# Patient Record
Sex: FEMALE | Race: WHITE | HISPANIC OR LATINO | URBAN - METROPOLITAN AREA
[De-identification: names, ages, dates, MRNs, and addresses within clinical notes are randomized per-mention and may not be internally consistent; named-entity substitution may affect disease eponyms.]

---

## 2023-01-01 ENCOUNTER — INPATIENT (INPATIENT)
Facility: HOSPITAL | Age: 53
LOS: 3 days | DRG: 20 | End: 2023-08-27
Attending: NEUROLOGICAL SURGERY | Admitting: NEUROLOGICAL SURGERY
Payer: MEDICAID

## 2023-01-01 VITALS — RESPIRATION RATE: 18 BRPM | OXYGEN SATURATION: 100 % | HEART RATE: 90 BPM

## 2023-01-01 VITALS
RESPIRATION RATE: 12 BRPM | SYSTOLIC BLOOD PRESSURE: 96 MMHG | DIASTOLIC BLOOD PRESSURE: 58 MMHG | HEART RATE: 74 BPM | OXYGEN SATURATION: 100 %

## 2023-01-01 DIAGNOSIS — B10.81 HUMAN HERPESVIRUS 6 INFECTION: ICD-10-CM

## 2023-01-01 DIAGNOSIS — G00.0: ICD-10-CM

## 2023-01-01 DIAGNOSIS — I60.9 NONTRAUMATIC SUBARACHNOID HEMORRHAGE, UNSPECIFIED: ICD-10-CM

## 2023-01-01 DIAGNOSIS — E23.2 DIABETES INSIPIDUS: ICD-10-CM

## 2023-01-01 DIAGNOSIS — I82.432 ACUTE EMBOLISM AND THROMBOSIS OF LEFT POPLITEAL VEIN: ICD-10-CM

## 2023-01-01 DIAGNOSIS — R29.731 NIHSS SCORE 31: ICD-10-CM

## 2023-01-01 DIAGNOSIS — R31.9 HEMATURIA, UNSPECIFIED: ICD-10-CM

## 2023-01-01 DIAGNOSIS — G93.6 CEREBRAL EDEMA: ICD-10-CM

## 2023-01-01 DIAGNOSIS — K92.2 GASTROINTESTINAL HEMORRHAGE, UNSPECIFIED: ICD-10-CM

## 2023-01-01 DIAGNOSIS — I63.9 CEREBRAL INFARCTION, UNSPECIFIED: ICD-10-CM

## 2023-01-01 DIAGNOSIS — R53.2 FUNCTIONAL QUADRIPLEGIA: ICD-10-CM

## 2023-01-01 DIAGNOSIS — D69.6 THROMBOCYTOPENIA, UNSPECIFIED: ICD-10-CM

## 2023-01-01 DIAGNOSIS — E09.65 DRUG OR CHEMICAL INDUCED DIABETES MELLITUS WITH HYPERGLYCEMIA: ICD-10-CM

## 2023-01-01 DIAGNOSIS — Z71.89 OTHER SPECIFIED COUNSELING: ICD-10-CM

## 2023-01-01 DIAGNOSIS — I21.4 NON-ST ELEVATION (NSTEMI) MYOCARDIAL INFARCTION: ICD-10-CM

## 2023-01-01 DIAGNOSIS — J15.211 PNEUMONIA DUE TO METHICILLIN SUSCEPTIBLE STAPHYLOCOCCUS AUREUS: ICD-10-CM

## 2023-01-01 DIAGNOSIS — J96.00 ACUTE RESPIRATORY FAILURE, UNSPECIFIED WHETHER WITH HYPOXIA OR HYPERCAPNIA: ICD-10-CM

## 2023-01-01 DIAGNOSIS — I82.452 ACUTE EMBOLISM AND THROMBOSIS OF LEFT PERONEAL VEIN: ICD-10-CM

## 2023-01-01 DIAGNOSIS — I10 ESSENTIAL (PRIMARY) HYPERTENSION: ICD-10-CM

## 2023-01-01 DIAGNOSIS — G93.2 BENIGN INTRACRANIAL HYPERTENSION: ICD-10-CM

## 2023-01-01 DIAGNOSIS — Z51.5 ENCOUNTER FOR PALLIATIVE CARE: ICD-10-CM

## 2023-01-01 DIAGNOSIS — I60.32 NONTRAUMATIC SUBARACHNOID HEMORRHAGE FROM LEFT POSTERIOR COMMUNICATING ARTERY: ICD-10-CM

## 2023-01-01 DIAGNOSIS — I61.5 NONTRAUMATIC INTRACEREBRAL HEMORRHAGE, INTRAVENTRICULAR: ICD-10-CM

## 2023-01-01 DIAGNOSIS — I63.512 CEREBRAL INFARCTION DUE TO UNSPECIFIED OCCLUSION OR STENOSIS OF LEFT MIDDLE CEREBRAL ARTERY: ICD-10-CM

## 2023-01-01 LAB
-  CLINDAMYCIN: SIGNIFICANT CHANGE UP
-  CLINDAMYCIN: SIGNIFICANT CHANGE UP
-  ERYTHROMYCIN: SIGNIFICANT CHANGE UP
-  ERYTHROMYCIN: SIGNIFICANT CHANGE UP
-  LINEZOLID: SIGNIFICANT CHANGE UP
-  LINEZOLID: SIGNIFICANT CHANGE UP
-  OXACILLIN: SIGNIFICANT CHANGE UP
-  OXACILLIN: SIGNIFICANT CHANGE UP
-  RIFAMPIN: SIGNIFICANT CHANGE UP
-  RIFAMPIN: SIGNIFICANT CHANGE UP
-  TRIMETHOPRIM/SULFAMETHOXAZOLE: SIGNIFICANT CHANGE UP
-  TRIMETHOPRIM/SULFAMETHOXAZOLE: SIGNIFICANT CHANGE UP
-  VANCOMYCIN: SIGNIFICANT CHANGE UP
-  VANCOMYCIN: SIGNIFICANT CHANGE UP
A1C WITH ESTIMATED AVERAGE GLUCOSE RESULT: 6.2 % — HIGH (ref 4–5.6)
ALBUMIN SERPL ELPH-MCNC: 2.7 G/DL — LOW (ref 3.3–5)
ALBUMIN SERPL ELPH-MCNC: 3 G/DL — LOW (ref 3.3–5)
ALBUMIN SERPL ELPH-MCNC: 3.4 G/DL — SIGNIFICANT CHANGE UP (ref 3.3–5)
ALBUMIN SERPL ELPH-MCNC: 3.5 G/DL — SIGNIFICANT CHANGE UP (ref 3.3–5)
ALBUMIN SERPL ELPH-MCNC: 4.2 G/DL — SIGNIFICANT CHANGE UP (ref 3.3–5)
ALBUMIN SERPL ELPH-MCNC: 4.2 G/DL — SIGNIFICANT CHANGE UP (ref 3.3–5)
ALP SERPL-CCNC: 56 U/L — SIGNIFICANT CHANGE UP (ref 40–120)
ALP SERPL-CCNC: 57 U/L — SIGNIFICANT CHANGE UP (ref 40–120)
ALP SERPL-CCNC: 61 U/L — SIGNIFICANT CHANGE UP (ref 40–120)
ALP SERPL-CCNC: 67 U/L — SIGNIFICANT CHANGE UP (ref 40–120)
ALP SERPL-CCNC: 67 U/L — SIGNIFICANT CHANGE UP (ref 40–120)
ALP SERPL-CCNC: 68 U/L — SIGNIFICANT CHANGE UP (ref 40–120)
ALT FLD-CCNC: 108 U/L — HIGH (ref 10–45)
ALT FLD-CCNC: 45 U/L — SIGNIFICANT CHANGE UP (ref 10–45)
ALT FLD-CCNC: 49 U/L — HIGH (ref 10–45)
ALT FLD-CCNC: 64 U/L — HIGH (ref 10–45)
ALT FLD-CCNC: 64 U/L — HIGH (ref 10–45)
ALT FLD-CCNC: 93 U/L — HIGH (ref 10–45)
ANION GAP SERPL CALC-SCNC: 10 MMOL/L — SIGNIFICANT CHANGE UP (ref 5–17)
ANION GAP SERPL CALC-SCNC: 11 MMOL/L — SIGNIFICANT CHANGE UP (ref 5–17)
ANION GAP SERPL CALC-SCNC: 11 MMOL/L — SIGNIFICANT CHANGE UP (ref 5–17)
ANION GAP SERPL CALC-SCNC: 12 MMOL/L — SIGNIFICANT CHANGE UP (ref 5–17)
ANION GAP SERPL CALC-SCNC: 12 MMOL/L — SIGNIFICANT CHANGE UP (ref 5–17)
ANION GAP SERPL CALC-SCNC: 16 MMOL/L — SIGNIFICANT CHANGE UP (ref 5–17)
ANION GAP SERPL CALC-SCNC: 6 MMOL/L — SIGNIFICANT CHANGE UP (ref 5–17)
ANION GAP SERPL CALC-SCNC: 7 MMOL/L — SIGNIFICANT CHANGE UP (ref 5–17)
ANION GAP SERPL CALC-SCNC: 8 MMOL/L — SIGNIFICANT CHANGE UP (ref 5–17)
ANION GAP SERPL CALC-SCNC: 9 MMOL/L — SIGNIFICANT CHANGE UP (ref 5–17)
ANION GAP SERPL CALC-SCNC: 9 MMOL/L — SIGNIFICANT CHANGE UP (ref 5–17)
APPEARANCE CSF: SIGNIFICANT CHANGE UP
APPEARANCE SPUN FLD: COLORLESS — SIGNIFICANT CHANGE UP
APPEARANCE UR: CLEAR — SIGNIFICANT CHANGE UP
APTT BLD: 29.9 SEC — SIGNIFICANT CHANGE UP (ref 24.5–35.6)
APTT BLD: 34 SEC — SIGNIFICANT CHANGE UP (ref 24.5–35.6)
AST SERPL-CCNC: 27 U/L — SIGNIFICANT CHANGE UP (ref 10–40)
AST SERPL-CCNC: 35 U/L — SIGNIFICANT CHANGE UP (ref 10–40)
AST SERPL-CCNC: 40 U/L — SIGNIFICANT CHANGE UP (ref 10–40)
AST SERPL-CCNC: 48 U/L — HIGH (ref 10–40)
AST SERPL-CCNC: 63 U/L — HIGH (ref 10–40)
AST SERPL-CCNC: 63 U/L — HIGH (ref 10–40)
BACTERIA # UR AUTO: SIGNIFICANT CHANGE UP /HPF
BASE EXCESS BLDA CALC-SCNC: -0.5 MMOL/L — SIGNIFICANT CHANGE UP (ref -2–3)
BASE EXCESS BLDA CALC-SCNC: -0.6 MMOL/L — SIGNIFICANT CHANGE UP (ref -2–3)
BASE EXCESS BLDA CALC-SCNC: -0.7 MMOL/L — SIGNIFICANT CHANGE UP (ref -2–3)
BASE EXCESS BLDA CALC-SCNC: -4.4 MMOL/L — LOW (ref -2–3)
BASE EXCESS BLDA CALC-SCNC: 0.7 MMOL/L — SIGNIFICANT CHANGE UP (ref -2–3)
BASE EXCESS BLDA CALC-SCNC: 1 MMOL/L — SIGNIFICANT CHANGE UP (ref -2–3)
BASE EXCESS BLDA CALC-SCNC: 1.3 MMOL/L — SIGNIFICANT CHANGE UP (ref -2–3)
BASE EXCESS BLDA CALC-SCNC: 2.8 MMOL/L — SIGNIFICANT CHANGE UP (ref -2–3)
BASE EXCESS BLDA CALC-SCNC: 3.5 MMOL/L — HIGH (ref -2–3)
BASE EXCESS BLDA CALC-SCNC: 3.7 MMOL/L — HIGH (ref -2–3)
BASOPHILS # BLD AUTO: 0.03 K/UL — SIGNIFICANT CHANGE UP (ref 0–0.2)
BASOPHILS NFR BLD AUTO: 0.2 % — SIGNIFICANT CHANGE UP (ref 0–2)
BILIRUB SERPL-MCNC: 0.2 MG/DL — SIGNIFICANT CHANGE UP (ref 0.2–1.2)
BILIRUB SERPL-MCNC: 0.2 MG/DL — SIGNIFICANT CHANGE UP (ref 0.2–1.2)
BILIRUB SERPL-MCNC: 0.4 MG/DL — SIGNIFICANT CHANGE UP (ref 0.2–1.2)
BILIRUB SERPL-MCNC: 0.5 MG/DL — SIGNIFICANT CHANGE UP (ref 0.2–1.2)
BILIRUB SERPL-MCNC: 0.5 MG/DL — SIGNIFICANT CHANGE UP (ref 0.2–1.2)
BILIRUB SERPL-MCNC: 0.6 MG/DL — SIGNIFICANT CHANGE UP (ref 0.2–1.2)
BILIRUB UR-MCNC: NEGATIVE — SIGNIFICANT CHANGE UP
BLD GP AB SCN SERPL QL: NEGATIVE — SIGNIFICANT CHANGE UP
BUN SERPL-MCNC: 10 MG/DL — SIGNIFICANT CHANGE UP (ref 7–23)
BUN SERPL-MCNC: 10 MG/DL — SIGNIFICANT CHANGE UP (ref 7–23)
BUN SERPL-MCNC: 11 MG/DL — SIGNIFICANT CHANGE UP (ref 7–23)
BUN SERPL-MCNC: 11 MG/DL — SIGNIFICANT CHANGE UP (ref 7–23)
BUN SERPL-MCNC: 4 MG/DL — LOW (ref 7–23)
BUN SERPL-MCNC: 5 MG/DL — LOW (ref 7–23)
BUN SERPL-MCNC: 5 MG/DL — LOW (ref 7–23)
BUN SERPL-MCNC: 6 MG/DL — LOW (ref 7–23)
BUN SERPL-MCNC: 7 MG/DL — SIGNIFICANT CHANGE UP (ref 7–23)
BUN SERPL-MCNC: 8 MG/DL — SIGNIFICANT CHANGE UP (ref 7–23)
BUN SERPL-MCNC: 8 MG/DL — SIGNIFICANT CHANGE UP (ref 7–23)
BUN SERPL-MCNC: 9 MG/DL — SIGNIFICANT CHANGE UP (ref 7–23)
CALCIUM SERPL-MCNC: 7.4 MG/DL — LOW (ref 8.4–10.5)
CALCIUM SERPL-MCNC: 7.5 MG/DL — LOW (ref 8.4–10.5)
CALCIUM SERPL-MCNC: 7.6 MG/DL — LOW (ref 8.4–10.5)
CALCIUM SERPL-MCNC: 7.8 MG/DL — LOW (ref 8.4–10.5)
CALCIUM SERPL-MCNC: 7.8 MG/DL — LOW (ref 8.4–10.5)
CALCIUM SERPL-MCNC: 7.9 MG/DL — LOW (ref 8.4–10.5)
CALCIUM SERPL-MCNC: 8.2 MG/DL — LOW (ref 8.4–10.5)
CALCIUM SERPL-MCNC: 8.5 MG/DL — SIGNIFICANT CHANGE UP (ref 8.4–10.5)
CALCIUM SERPL-MCNC: 8.7 MG/DL — SIGNIFICANT CHANGE UP (ref 8.4–10.5)
CALCIUM SERPL-MCNC: 8.7 MG/DL — SIGNIFICANT CHANGE UP (ref 8.4–10.5)
CALCIUM SERPL-MCNC: 8.8 MG/DL — SIGNIFICANT CHANGE UP (ref 8.4–10.5)
CALCIUM SERPL-MCNC: 8.9 MG/DL — SIGNIFICANT CHANGE UP (ref 8.4–10.5)
CALCIUM SERPL-MCNC: 8.9 MG/DL — SIGNIFICANT CHANGE UP (ref 8.4–10.5)
CALCIUM SERPL-MCNC: 9.1 MG/DL — SIGNIFICANT CHANGE UP (ref 8.4–10.5)
CALCIUM SERPL-MCNC: 9.2 MG/DL — SIGNIFICANT CHANGE UP (ref 8.4–10.5)
CALCIUM SERPL-MCNC: 9.3 MG/DL — SIGNIFICANT CHANGE UP (ref 8.4–10.5)
CALCIUM SERPL-MCNC: 9.4 MG/DL — SIGNIFICANT CHANGE UP (ref 8.4–10.5)
CHLORIDE SERPL-SCNC: 102 MMOL/L — SIGNIFICANT CHANGE UP (ref 96–108)
CHLORIDE SERPL-SCNC: 105 MMOL/L — SIGNIFICANT CHANGE UP (ref 96–108)
CHLORIDE SERPL-SCNC: 120 MMOL/L — HIGH (ref 96–108)
CHLORIDE SERPL-SCNC: 122 MMOL/L — HIGH (ref 96–108)
CHLORIDE SERPL-SCNC: 124 MMOL/L — HIGH (ref 96–108)
CHLORIDE SERPL-SCNC: 126 MMOL/L — HIGH (ref 96–108)
CHLORIDE SERPL-SCNC: 126 MMOL/L — HIGH (ref 96–108)
CHLORIDE SERPL-SCNC: 127 MMOL/L — HIGH (ref 96–108)
CHLORIDE SERPL-SCNC: 128 MMOL/L — HIGH (ref 96–108)
CHLORIDE SERPL-SCNC: 129 MMOL/L — HIGH (ref 96–108)
CHLORIDE SERPL-SCNC: 130 MMOL/L — HIGH (ref 96–108)
CHLORIDE SERPL-SCNC: 131 MMOL/L — HIGH (ref 96–108)
CHLORIDE SERPL-SCNC: 132 MMOL/L — HIGH (ref 96–108)
CHLORIDE SERPL-SCNC: 135 MMOL/L — HIGH (ref 96–108)
CHLORIDE SERPL-SCNC: 137 MMOL/L — HIGH (ref 96–108)
CHLORIDE SERPL-SCNC: 138 MMOL/L — HIGH (ref 96–108)
CHOLEST SERPL-MCNC: 252 MG/DL — HIGH
CK MB CFR SERPL CALC: 13.7 NG/ML — HIGH (ref 0–6.7)
CK MB CFR SERPL CALC: 14.7 NG/ML — HIGH (ref 0–6.7)
CK MB CFR SERPL CALC: 22.6 NG/ML — HIGH (ref 0–6.7)
CK SERPL-CCNC: 292 U/L — HIGH (ref 25–170)
CK SERPL-CCNC: 615 U/L — HIGH (ref 25–170)
CK SERPL-CCNC: 663 U/L — HIGH (ref 25–170)
CO2 BLDA-SCNC: 22 MMOL/L — SIGNIFICANT CHANGE UP (ref 19–24)
CO2 BLDA-SCNC: 25 MMOL/L — HIGH (ref 19–24)
CO2 BLDA-SCNC: 27 MMOL/L — HIGH (ref 19–24)
CO2 BLDA-SCNC: 29 MMOL/L — HIGH (ref 19–24)
CO2 BLDA-SCNC: 30 MMOL/L — HIGH (ref 19–24)
CO2 BLDA-SCNC: 34 MMOL/L — HIGH (ref 19–24)
CO2 SERPL-SCNC: 20 MMOL/L — LOW (ref 22–31)
CO2 SERPL-SCNC: 20 MMOL/L — LOW (ref 22–31)
CO2 SERPL-SCNC: 21 MMOL/L — LOW (ref 22–31)
CO2 SERPL-SCNC: 22 MMOL/L — SIGNIFICANT CHANGE UP (ref 22–31)
CO2 SERPL-SCNC: 22 MMOL/L — SIGNIFICANT CHANGE UP (ref 22–31)
CO2 SERPL-SCNC: 23 MMOL/L — SIGNIFICANT CHANGE UP (ref 22–31)
CO2 SERPL-SCNC: 24 MMOL/L — SIGNIFICANT CHANGE UP (ref 22–31)
CO2 SERPL-SCNC: 25 MMOL/L — SIGNIFICANT CHANGE UP (ref 22–31)
CO2 SERPL-SCNC: 26 MMOL/L — SIGNIFICANT CHANGE UP (ref 22–31)
CO2 SERPL-SCNC: 26 MMOL/L — SIGNIFICANT CHANGE UP (ref 22–31)
CO2 SERPL-SCNC: 28 MMOL/L — SIGNIFICANT CHANGE UP (ref 22–31)
COLOR CSF: ABNORMAL
COLOR SPEC: YELLOW — SIGNIFICANT CHANGE UP
CREAT SERPL-MCNC: 0.44 MG/DL — LOW (ref 0.5–1.3)
CREAT SERPL-MCNC: 0.53 MG/DL — SIGNIFICANT CHANGE UP (ref 0.5–1.3)
CREAT SERPL-MCNC: 0.54 MG/DL — SIGNIFICANT CHANGE UP (ref 0.5–1.3)
CREAT SERPL-MCNC: 0.55 MG/DL — SIGNIFICANT CHANGE UP (ref 0.5–1.3)
CREAT SERPL-MCNC: 0.56 MG/DL — SIGNIFICANT CHANGE UP (ref 0.5–1.3)
CREAT SERPL-MCNC: 0.6 MG/DL — SIGNIFICANT CHANGE UP (ref 0.5–1.3)
CREAT SERPL-MCNC: 0.62 MG/DL — SIGNIFICANT CHANGE UP (ref 0.5–1.3)
CREAT SERPL-MCNC: 0.62 MG/DL — SIGNIFICANT CHANGE UP (ref 0.5–1.3)
CREAT SERPL-MCNC: 0.63 MG/DL — SIGNIFICANT CHANGE UP (ref 0.5–1.3)
CREAT SERPL-MCNC: 0.63 MG/DL — SIGNIFICANT CHANGE UP (ref 0.5–1.3)
CREAT SERPL-MCNC: 0.65 MG/DL — SIGNIFICANT CHANGE UP (ref 0.5–1.3)
CREAT SERPL-MCNC: 0.65 MG/DL — SIGNIFICANT CHANGE UP (ref 0.5–1.3)
CREAT SERPL-MCNC: 0.66 MG/DL — SIGNIFICANT CHANGE UP (ref 0.5–1.3)
CREAT SERPL-MCNC: 0.66 MG/DL — SIGNIFICANT CHANGE UP (ref 0.5–1.3)
CREAT SERPL-MCNC: 0.67 MG/DL — SIGNIFICANT CHANGE UP (ref 0.5–1.3)
CREAT SERPL-MCNC: 0.74 MG/DL — SIGNIFICANT CHANGE UP (ref 0.5–1.3)
CREAT SERPL-MCNC: 0.76 MG/DL — SIGNIFICANT CHANGE UP (ref 0.5–1.3)
CREAT SERPL-MCNC: 0.78 MG/DL — SIGNIFICANT CHANGE UP (ref 0.5–1.3)
CREAT SERPL-MCNC: 0.8 MG/DL — SIGNIFICANT CHANGE UP (ref 0.5–1.3)
CREAT SERPL-MCNC: 0.8 MG/DL — SIGNIFICANT CHANGE UP (ref 0.5–1.3)
CSF COMMENTS: SIGNIFICANT CHANGE UP
CSF PCR RESULT: DETECTED
CSF PCR RESULT: DETECTED
CULTURE RESULTS: NO GROWTH — SIGNIFICANT CHANGE UP
CULTURE RESULTS: SIGNIFICANT CHANGE UP
DIFF PNL FLD: ABNORMAL
EGFR: 104 ML/MIN/1.73M2 — SIGNIFICANT CHANGE UP
EGFR: 105 ML/MIN/1.73M2 — SIGNIFICANT CHANGE UP
EGFR: 106 ML/MIN/1.73M2 — SIGNIFICANT CHANGE UP
EGFR: 106 ML/MIN/1.73M2 — SIGNIFICANT CHANGE UP
EGFR: 107 ML/MIN/1.73M2 — SIGNIFICANT CHANGE UP
EGFR: 108 ML/MIN/1.73M2 — SIGNIFICANT CHANGE UP
EGFR: 109 ML/MIN/1.73M2 — SIGNIFICANT CHANGE UP
EGFR: 109 ML/MIN/1.73M2 — SIGNIFICANT CHANGE UP
EGFR: 110 ML/MIN/1.73M2 — SIGNIFICANT CHANGE UP
EGFR: 115 ML/MIN/1.73M2 — SIGNIFICANT CHANGE UP
EGFR: 88 ML/MIN/1.73M2 — SIGNIFICANT CHANGE UP
EGFR: 88 ML/MIN/1.73M2 — SIGNIFICANT CHANGE UP
EGFR: 90 ML/MIN/1.73M2 — SIGNIFICANT CHANGE UP
EGFR: 93 ML/MIN/1.73M2 — SIGNIFICANT CHANGE UP
EGFR: 96 ML/MIN/1.73M2 — SIGNIFICANT CHANGE UP
EOSINOPHIL # BLD AUTO: 0 K/UL — SIGNIFICANT CHANGE UP (ref 0–0.5)
EOSINOPHIL NFR BLD AUTO: 0 % — SIGNIFICANT CHANGE UP (ref 0–6)
EPI CELLS # UR: SIGNIFICANT CHANGE UP /HPF (ref 0–5)
ESTIMATED AVERAGE GLUCOSE: 131 MG/DL — HIGH (ref 68–114)
GAS PNL BLDA: SIGNIFICANT CHANGE UP
GLUCOSE BLDC GLUCOMTR-MCNC: 108 MG/DL — HIGH (ref 70–99)
GLUCOSE BLDC GLUCOMTR-MCNC: 109 MG/DL — HIGH (ref 70–99)
GLUCOSE BLDC GLUCOMTR-MCNC: 116 MG/DL — HIGH (ref 70–99)
GLUCOSE BLDC GLUCOMTR-MCNC: 117 MG/DL — HIGH (ref 70–99)
GLUCOSE BLDC GLUCOMTR-MCNC: 133 MG/DL — HIGH (ref 70–99)
GLUCOSE BLDC GLUCOMTR-MCNC: 134 MG/DL — HIGH (ref 70–99)
GLUCOSE BLDC GLUCOMTR-MCNC: 138 MG/DL — HIGH (ref 70–99)
GLUCOSE BLDC GLUCOMTR-MCNC: 143 MG/DL — HIGH (ref 70–99)
GLUCOSE BLDC GLUCOMTR-MCNC: 148 MG/DL — HIGH (ref 70–99)
GLUCOSE BLDC GLUCOMTR-MCNC: 149 MG/DL — HIGH (ref 70–99)
GLUCOSE BLDC GLUCOMTR-MCNC: 150 MG/DL — HIGH (ref 70–99)
GLUCOSE BLDC GLUCOMTR-MCNC: 153 MG/DL — HIGH (ref 70–99)
GLUCOSE BLDC GLUCOMTR-MCNC: 153 MG/DL — HIGH (ref 70–99)
GLUCOSE BLDC GLUCOMTR-MCNC: 154 MG/DL — HIGH (ref 70–99)
GLUCOSE BLDC GLUCOMTR-MCNC: 159 MG/DL — HIGH (ref 70–99)
GLUCOSE BLDC GLUCOMTR-MCNC: 167 MG/DL — HIGH (ref 70–99)
GLUCOSE BLDC GLUCOMTR-MCNC: 168 MG/DL — HIGH (ref 70–99)
GLUCOSE BLDC GLUCOMTR-MCNC: 170 MG/DL — HIGH (ref 70–99)
GLUCOSE BLDC GLUCOMTR-MCNC: 173 MG/DL — HIGH (ref 70–99)
GLUCOSE BLDC GLUCOMTR-MCNC: 174 MG/DL — HIGH (ref 70–99)
GLUCOSE BLDC GLUCOMTR-MCNC: 176 MG/DL — HIGH (ref 70–99)
GLUCOSE BLDC GLUCOMTR-MCNC: 198 MG/DL — HIGH (ref 70–99)
GLUCOSE BLDC GLUCOMTR-MCNC: 203 MG/DL — HIGH (ref 70–99)
GLUCOSE BLDC GLUCOMTR-MCNC: 204 MG/DL — HIGH (ref 70–99)
GLUCOSE BLDC GLUCOMTR-MCNC: 209 MG/DL — HIGH (ref 70–99)
GLUCOSE BLDC GLUCOMTR-MCNC: 209 MG/DL — HIGH (ref 70–99)
GLUCOSE BLDC GLUCOMTR-MCNC: 213 MG/DL — HIGH (ref 70–99)
GLUCOSE BLDC GLUCOMTR-MCNC: 217 MG/DL — HIGH (ref 70–99)
GLUCOSE BLDC GLUCOMTR-MCNC: 225 MG/DL — HIGH (ref 70–99)
GLUCOSE BLDC GLUCOMTR-MCNC: 226 MG/DL — HIGH (ref 70–99)
GLUCOSE BLDC GLUCOMTR-MCNC: 249 MG/DL — HIGH (ref 70–99)
GLUCOSE BLDC GLUCOMTR-MCNC: 254 MG/DL — HIGH (ref 70–99)
GLUCOSE BLDC GLUCOMTR-MCNC: 294 MG/DL — HIGH (ref 70–99)
GLUCOSE BLDC GLUCOMTR-MCNC: 313 MG/DL — HIGH (ref 70–99)
GLUCOSE BLDC GLUCOMTR-MCNC: 318 MG/DL — HIGH (ref 70–99)
GLUCOSE BLDC GLUCOMTR-MCNC: 321 MG/DL — HIGH (ref 70–99)
GLUCOSE BLDC GLUCOMTR-MCNC: 407 MG/DL — HIGH (ref 70–99)
GLUCOSE BLDC GLUCOMTR-MCNC: 415 MG/DL — HIGH (ref 70–99)
GLUCOSE BLDC GLUCOMTR-MCNC: 416 MG/DL — HIGH (ref 70–99)
GLUCOSE BLDC GLUCOMTR-MCNC: 458 MG/DL — CRITICAL HIGH (ref 70–99)
GLUCOSE BLDC GLUCOMTR-MCNC: 481 MG/DL — CRITICAL HIGH (ref 70–99)
GLUCOSE BLDC GLUCOMTR-MCNC: 501 MG/DL — CRITICAL HIGH (ref 70–99)
GLUCOSE BLDC GLUCOMTR-MCNC: 90 MG/DL — SIGNIFICANT CHANGE UP (ref 70–99)
GLUCOSE CSF-MCNC: 68 MG/DL — SIGNIFICANT CHANGE UP (ref 40–70)
GLUCOSE SERPL-MCNC: 113 MG/DL — HIGH (ref 70–99)
GLUCOSE SERPL-MCNC: 144 MG/DL — HIGH (ref 70–99)
GLUCOSE SERPL-MCNC: 146 MG/DL — HIGH (ref 70–99)
GLUCOSE SERPL-MCNC: 148 MG/DL — HIGH (ref 70–99)
GLUCOSE SERPL-MCNC: 160 MG/DL — HIGH (ref 70–99)
GLUCOSE SERPL-MCNC: 160 MG/DL — HIGH (ref 70–99)
GLUCOSE SERPL-MCNC: 161 MG/DL — HIGH (ref 70–99)
GLUCOSE SERPL-MCNC: 171 MG/DL — HIGH (ref 70–99)
GLUCOSE SERPL-MCNC: 181 MG/DL — HIGH (ref 70–99)
GLUCOSE SERPL-MCNC: 184 MG/DL — HIGH (ref 70–99)
GLUCOSE SERPL-MCNC: 186 MG/DL — HIGH (ref 70–99)
GLUCOSE SERPL-MCNC: 201 MG/DL — HIGH (ref 70–99)
GLUCOSE SERPL-MCNC: 202 MG/DL — HIGH (ref 70–99)
GLUCOSE SERPL-MCNC: 206 MG/DL — HIGH (ref 70–99)
GLUCOSE SERPL-MCNC: 270 MG/DL — HIGH (ref 70–99)
GLUCOSE SERPL-MCNC: 284 MG/DL — HIGH (ref 70–99)
GLUCOSE SERPL-MCNC: 334 MG/DL — HIGH (ref 70–99)
GLUCOSE SERPL-MCNC: 353 MG/DL — HIGH (ref 70–99)
GLUCOSE SERPL-MCNC: 382 MG/DL — HIGH (ref 70–99)
GLUCOSE SERPL-MCNC: 384 MG/DL — HIGH (ref 70–99)
GLUCOSE SERPL-MCNC: 396 MG/DL — HIGH (ref 70–99)
GLUCOSE SERPL-MCNC: 578 MG/DL — CRITICAL HIGH (ref 70–99)
GLUCOSE UR QL: NEGATIVE — SIGNIFICANT CHANGE UP
GRAM STN FLD: SIGNIFICANT CHANGE UP
HAEM INFLU DNA SPEC QL NAA+PROBE: DETECTED
HCG SERPL-ACNC: 0 MIU/ML — SIGNIFICANT CHANGE UP
HCO3 BLDA-SCNC: 21 MMOL/L — SIGNIFICANT CHANGE UP (ref 21–28)
HCO3 BLDA-SCNC: 24 MMOL/L — SIGNIFICANT CHANGE UP (ref 21–28)
HCO3 BLDA-SCNC: 25 MMOL/L — SIGNIFICANT CHANGE UP (ref 21–28)
HCO3 BLDA-SCNC: 28 MMOL/L — SIGNIFICANT CHANGE UP (ref 21–28)
HCO3 BLDA-SCNC: 31 MMOL/L — HIGH (ref 21–28)
HCO3 BLDA-SCNC: 31 MMOL/L — HIGH (ref 21–28)
HCO3 BLDA-SCNC: 32 MMOL/L — HIGH (ref 21–28)
HCT VFR BLD CALC: 30.2 % — LOW (ref 34.5–45)
HCT VFR BLD CALC: 30.7 % — LOW (ref 34.5–45)
HCT VFR BLD CALC: 34 % — LOW (ref 34.5–45)
HCT VFR BLD CALC: 38.1 % — SIGNIFICANT CHANGE UP (ref 34.5–45)
HCT VFR BLD CALC: 40.4 % — SIGNIFICANT CHANGE UP (ref 34.5–45)
HCT VFR BLD CALC: 40.4 % — SIGNIFICANT CHANGE UP (ref 34.5–45)
HCT VFR BLD CALC: 42.2 % — SIGNIFICANT CHANGE UP (ref 34.5–45)
HCT VFR BLD CALC: 42.8 % — SIGNIFICANT CHANGE UP (ref 34.5–45)
HCT VFR BLD CALC: 44.3 % — SIGNIFICANT CHANGE UP (ref 34.5–45)
HDLC SERPL-MCNC: 52 MG/DL — SIGNIFICANT CHANGE UP
HGB BLD-MCNC: 10.9 G/DL — LOW (ref 11.5–15.5)
HGB BLD-MCNC: 13.2 G/DL — SIGNIFICANT CHANGE UP (ref 11.5–15.5)
HGB BLD-MCNC: 13.2 G/DL — SIGNIFICANT CHANGE UP (ref 11.5–15.5)
HGB BLD-MCNC: 13.7 G/DL — SIGNIFICANT CHANGE UP (ref 11.5–15.5)
HGB BLD-MCNC: 14.3 G/DL — SIGNIFICANT CHANGE UP (ref 11.5–15.5)
HGB BLD-MCNC: 14.7 G/DL — SIGNIFICANT CHANGE UP (ref 11.5–15.5)
HGB BLD-MCNC: 15 G/DL — SIGNIFICANT CHANGE UP (ref 11.5–15.5)
HGB BLD-MCNC: 9.4 G/DL — LOW (ref 11.5–15.5)
HGB BLD-MCNC: 9.9 G/DL — LOW (ref 11.5–15.5)
HHV6 DNA CSF QL NAA+PROBE: DETECTED
HHV6 DNA CSF QL NAA+PROBE: DETECTED
IMM GRANULOCYTES NFR BLD AUTO: 0.4 % — SIGNIFICANT CHANGE UP (ref 0–0.9)
INR BLD: 0.98 — SIGNIFICANT CHANGE UP (ref 0.85–1.18)
INR BLD: 1.05 — SIGNIFICANT CHANGE UP (ref 0.85–1.18)
ISTAT ACTK (ACTIVATED CLOTTING TIME KAOLIN): 131 SEC — SIGNIFICANT CHANGE UP (ref 74–137)
KETONES UR-MCNC: NEGATIVE — SIGNIFICANT CHANGE UP
LACTATE SERPL-SCNC: 2 MMOL/L — SIGNIFICANT CHANGE UP (ref 0.5–2)
LACTATE SERPL-SCNC: 2.3 MMOL/L — HIGH (ref 0.5–2)
LACTATE SERPL-SCNC: 2.5 MMOL/L — HIGH (ref 0.5–2)
LACTATE SERPL-SCNC: 2.7 MMOL/L — HIGH (ref 0.5–2)
LACTATE SERPL-SCNC: 3.1 MMOL/L — HIGH (ref 0.5–2)
LACTATE SERPL-SCNC: 3.3 MMOL/L — HIGH (ref 0.5–2)
LACTATE SERPL-SCNC: 3.7 MMOL/L — HIGH (ref 0.5–2)
LEUKOCYTE ESTERASE UR-ACNC: NEGATIVE — SIGNIFICANT CHANGE UP
LIPID PNL WITH DIRECT LDL SERPL: 164 MG/DL — HIGH
LYMPHOCYTES # BLD AUTO: 22.9 % — SIGNIFICANT CHANGE UP (ref 13–44)
LYMPHOCYTES # BLD AUTO: 3.21 K/UL — SIGNIFICANT CHANGE UP (ref 1–3.3)
LYMPHOCYTES # CSF: 2 % — LOW (ref 40–80)
MAGNESIUM SERPL-MCNC: 1.6 MG/DL — SIGNIFICANT CHANGE UP (ref 1.6–2.6)
MAGNESIUM SERPL-MCNC: 1.8 MG/DL — SIGNIFICANT CHANGE UP (ref 1.6–2.6)
MAGNESIUM SERPL-MCNC: 2 MG/DL — SIGNIFICANT CHANGE UP (ref 1.6–2.6)
MAGNESIUM SERPL-MCNC: 2 MG/DL — SIGNIFICANT CHANGE UP (ref 1.6–2.6)
MAGNESIUM SERPL-MCNC: 2.3 MG/DL — SIGNIFICANT CHANGE UP (ref 1.6–2.6)
MAGNESIUM SERPL-MCNC: 2.4 MG/DL — SIGNIFICANT CHANGE UP (ref 1.6–2.6)
MAGNESIUM SERPL-MCNC: 2.6 MG/DL — SIGNIFICANT CHANGE UP (ref 1.6–2.6)
MCHC RBC-ENTMCNC: 29.9 PG — SIGNIFICANT CHANGE UP (ref 27–34)
MCHC RBC-ENTMCNC: 30.2 PG — SIGNIFICANT CHANGE UP (ref 27–34)
MCHC RBC-ENTMCNC: 30.4 PG — SIGNIFICANT CHANGE UP (ref 27–34)
MCHC RBC-ENTMCNC: 30.7 PG — SIGNIFICANT CHANGE UP (ref 27–34)
MCHC RBC-ENTMCNC: 31.1 GM/DL — LOW (ref 32–36)
MCHC RBC-ENTMCNC: 32.1 GM/DL — SIGNIFICANT CHANGE UP (ref 32–36)
MCHC RBC-ENTMCNC: 32.2 GM/DL — SIGNIFICANT CHANGE UP (ref 32–36)
MCHC RBC-ENTMCNC: 32.7 GM/DL — SIGNIFICANT CHANGE UP (ref 32–36)
MCHC RBC-ENTMCNC: 33.9 GM/DL — SIGNIFICANT CHANGE UP (ref 32–36)
MCHC RBC-ENTMCNC: 34.3 GM/DL — SIGNIFICANT CHANGE UP (ref 32–36)
MCHC RBC-ENTMCNC: 34.6 GM/DL — SIGNIFICANT CHANGE UP (ref 32–36)
MCV RBC AUTO: 87.2 FL — SIGNIFICANT CHANGE UP (ref 80–100)
MCV RBC AUTO: 88.4 FL — SIGNIFICANT CHANGE UP (ref 80–100)
MCV RBC AUTO: 88.6 FL — SIGNIFICANT CHANGE UP (ref 80–100)
MCV RBC AUTO: 89 FL — SIGNIFICANT CHANGE UP (ref 80–100)
MCV RBC AUTO: 89.8 FL — SIGNIFICANT CHANGE UP (ref 80–100)
MCV RBC AUTO: 92.4 FL — SIGNIFICANT CHANGE UP (ref 80–100)
MCV RBC AUTO: 95 FL — SIGNIFICANT CHANGE UP (ref 80–100)
MCV RBC AUTO: 95.3 FL — SIGNIFICANT CHANGE UP (ref 80–100)
MCV RBC AUTO: 97.1 FL — SIGNIFICANT CHANGE UP (ref 80–100)
METHOD TYPE: SIGNIFICANT CHANGE UP
METHOD TYPE: SIGNIFICANT CHANGE UP
MONOCYTES # BLD AUTO: 1.05 K/UL — HIGH (ref 0–0.9)
MONOCYTES NFR BLD AUTO: 7.5 % — SIGNIFICANT CHANGE UP (ref 2–14)
MONOS+MACROS NFR CSF: 2 % — LOW (ref 15–45)
NEUTROPHILS # BLD AUTO: 9.66 K/UL — HIGH (ref 1.8–7.4)
NEUTROPHILS # CSF: 5 % — SIGNIFICANT CHANGE UP (ref 0–6)
NEUTROPHILS NFR BLD AUTO: 69 % — SIGNIFICANT CHANGE UP (ref 43–77)
NITRITE UR-MCNC: NEGATIVE — SIGNIFICANT CHANGE UP
NON HDL CHOLESTEROL: 200 MG/DL — HIGH
NRBC # BLD: 0 /100 WBCS — SIGNIFICANT CHANGE UP (ref 0–0)
NRBC NFR CSF: 9 /UL — HIGH (ref 0–5)
NT-PROBNP SERPL-SCNC: 1409 PG/ML — HIGH (ref 0–300)
NT-PROBNP SERPL-SCNC: 3108 PG/ML — HIGH (ref 0–300)
NT-PROBNP SERPL-SCNC: 829 PG/ML — HIGH (ref 0–300)
OB PNL GAST: POSITIVE
OCCULT BLOOD, GASTRIC FLUID PH: 3 — SIGNIFICANT CHANGE UP (ref 1–7)
ORGANISM # SPEC MICROSCOPIC CNT: SIGNIFICANT CHANGE UP
ORGANISM # SPEC MICROSCOPIC CNT: SIGNIFICANT CHANGE UP
OSMOLALITY SERPL: 324 MOSM/KG — HIGH (ref 275–300)
OSMOLALITY SERPL: 326 MOSM/KG — HIGH (ref 275–300)
OSMOLALITY SERPL: 334 MOSM/KG — HIGH (ref 275–300)
OSMOLALITY SERPL: 345 MOSM/KG — HIGH (ref 275–300)
OSMOLALITY SERPL: 346 MOSM/KG — HIGH (ref 275–300)
OSMOLALITY SERPL: 347 MOSM/KG — HIGH (ref 275–300)
OSMOLALITY SERPL: 349 MOSM/KG — HIGH (ref 275–300)
OSMOLALITY SERPL: 351 MOSM/KG — HIGH (ref 275–300)
OSMOLALITY SERPL: 354 MOSM/KG — HIGH (ref 275–300)
OSMOLALITY SERPL: 361 MOSM/KG — HIGH (ref 275–300)
OSMOLALITY SERPL: 362 MOSM/KG — HIGH (ref 275–300)
OSMOLALITY SERPL: 362 MOSM/KG — HIGH (ref 275–300)
OSMOLALITY SERPL: 364 MOSM/KG — HIGH (ref 275–300)
OSMOLALITY SERPL: 365 MOSM/KG — HIGH (ref 275–300)
OSMOLALITY UR: 174 MOSM/KG — LOW (ref 300–900)
OSMOLALITY UR: 97 MOSM/KG — LOW (ref 300–900)
PCO2 BLDA: 39 MMHG — SIGNIFICANT CHANGE UP (ref 32–45)
PCO2 BLDA: 39 MMHG — SIGNIFICANT CHANGE UP (ref 32–45)
PCO2 BLDA: 40 MMHG — SIGNIFICANT CHANGE UP (ref 32–45)
PCO2 BLDA: 47 MMHG — HIGH (ref 32–45)
PCO2 BLDA: 60 MMHG — HIGH (ref 32–45)
PCO2 BLDA: 78 MMHG — CRITICAL HIGH (ref 32–45)
PCO2 BLDA: 79 MMHG — CRITICAL HIGH (ref 32–45)
PCO2 BLDA: 92 MMHG — CRITICAL HIGH (ref 32–45)
PH BLDA: 7.14 — CRITICAL LOW (ref 7.35–7.45)
PH BLDA: 7.21 — LOW (ref 7.35–7.45)
PH BLDA: 7.21 — LOW (ref 7.35–7.45)
PH BLDA: 7.27 — LOW (ref 7.35–7.45)
PH BLDA: 7.34 — LOW (ref 7.35–7.45)
PH BLDA: 7.39 — SIGNIFICANT CHANGE UP (ref 7.35–7.45)
PH BLDA: 7.39 — SIGNIFICANT CHANGE UP (ref 7.35–7.45)
PH BLDA: 7.41 — SIGNIFICANT CHANGE UP (ref 7.35–7.45)
PH BLDA: 7.45 — SIGNIFICANT CHANGE UP (ref 7.35–7.45)
PH BLDA: 7.46 — HIGH (ref 7.35–7.45)
PH UR: 7 — SIGNIFICANT CHANGE UP (ref 5–8)
PHOSPHATE SERPL-MCNC: 1.1 MG/DL — LOW (ref 2.5–4.5)
PHOSPHATE SERPL-MCNC: 1.2 MG/DL — LOW (ref 2.5–4.5)
PHOSPHATE SERPL-MCNC: 1.6 MG/DL — LOW (ref 2.5–4.5)
PHOSPHATE SERPL-MCNC: 2 MG/DL — LOW (ref 2.5–4.5)
PHOSPHATE SERPL-MCNC: 2 MG/DL — LOW (ref 2.5–4.5)
PHOSPHATE SERPL-MCNC: 2.1 MG/DL — LOW (ref 2.5–4.5)
PHOSPHATE SERPL-MCNC: 2.4 MG/DL — LOW (ref 2.5–4.5)
PHOSPHATE SERPL-MCNC: 3.7 MG/DL — SIGNIFICANT CHANGE UP (ref 2.5–4.5)
PHOSPHATE SERPL-MCNC: 3.9 MG/DL — SIGNIFICANT CHANGE UP (ref 2.5–4.5)
PHOSPHATE SERPL-MCNC: 4.4 MG/DL — SIGNIFICANT CHANGE UP (ref 2.5–4.5)
PHOSPHATE SERPL-MCNC: 4.8 MG/DL — HIGH (ref 2.5–4.5)
PHOSPHATE SERPL-MCNC: 4.8 MG/DL — HIGH (ref 2.5–4.5)
PLATELET # BLD AUTO: 150 K/UL — SIGNIFICANT CHANGE UP (ref 150–400)
PLATELET # BLD AUTO: 172 K/UL — SIGNIFICANT CHANGE UP (ref 150–400)
PLATELET # BLD AUTO: 172 K/UL — SIGNIFICANT CHANGE UP (ref 150–400)
PLATELET # BLD AUTO: 242 K/UL — SIGNIFICANT CHANGE UP (ref 150–400)
PLATELET # BLD AUTO: 245 K/UL — SIGNIFICANT CHANGE UP (ref 150–400)
PLATELET # BLD AUTO: 252 K/UL — SIGNIFICANT CHANGE UP (ref 150–400)
PLATELET # BLD AUTO: 74 K/UL — LOW (ref 150–400)
PLATELET # BLD AUTO: 75 K/UL — LOW (ref 150–400)
PLATELET # BLD AUTO: 97 K/UL — LOW (ref 150–400)
PO2 BLDA: 130 MMHG — HIGH (ref 83–108)
PO2 BLDA: 152 MMHG — HIGH (ref 83–108)
PO2 BLDA: 159 MMHG — HIGH (ref 83–108)
PO2 BLDA: 169 MMHG — HIGH (ref 83–108)
PO2 BLDA: 263 MMHG — HIGH (ref 83–108)
PO2 BLDA: 287 MMHG — HIGH (ref 83–108)
PO2 BLDA: 304 MMHG — HIGH (ref 83–108)
PO2 BLDA: 343 MMHG — HIGH (ref 83–108)
PO2 BLDA: 89 MMHG — SIGNIFICANT CHANGE UP (ref 83–108)
PO2 BLDA: 93 MMHG — SIGNIFICANT CHANGE UP (ref 83–108)
POTASSIUM SERPL-MCNC: 2.8 MMOL/L — CRITICAL LOW (ref 3.5–5.3)
POTASSIUM SERPL-MCNC: 2.9 MMOL/L — CRITICAL LOW (ref 3.5–5.3)
POTASSIUM SERPL-MCNC: 3.1 MMOL/L — LOW (ref 3.5–5.3)
POTASSIUM SERPL-MCNC: 3.2 MMOL/L — LOW (ref 3.5–5.3)
POTASSIUM SERPL-MCNC: 3.2 MMOL/L — LOW (ref 3.5–5.3)
POTASSIUM SERPL-MCNC: 3.3 MMOL/L — LOW (ref 3.5–5.3)
POTASSIUM SERPL-MCNC: 3.4 MMOL/L — LOW (ref 3.5–5.3)
POTASSIUM SERPL-MCNC: 3.5 MMOL/L — SIGNIFICANT CHANGE UP (ref 3.5–5.3)
POTASSIUM SERPL-MCNC: 3.5 MMOL/L — SIGNIFICANT CHANGE UP (ref 3.5–5.3)
POTASSIUM SERPL-MCNC: 3.6 MMOL/L — SIGNIFICANT CHANGE UP (ref 3.5–5.3)
POTASSIUM SERPL-MCNC: 3.8 MMOL/L — SIGNIFICANT CHANGE UP (ref 3.5–5.3)
POTASSIUM SERPL-MCNC: 3.8 MMOL/L — SIGNIFICANT CHANGE UP (ref 3.5–5.3)
POTASSIUM SERPL-MCNC: 4 MMOL/L — SIGNIFICANT CHANGE UP (ref 3.5–5.3)
POTASSIUM SERPL-MCNC: 4 MMOL/L — SIGNIFICANT CHANGE UP (ref 3.5–5.3)
POTASSIUM SERPL-MCNC: 4.1 MMOL/L — SIGNIFICANT CHANGE UP (ref 3.5–5.3)
POTASSIUM SERPL-MCNC: 4.4 MMOL/L — SIGNIFICANT CHANGE UP (ref 3.5–5.3)
POTASSIUM SERPL-MCNC: 4.4 MMOL/L — SIGNIFICANT CHANGE UP (ref 3.5–5.3)
POTASSIUM SERPL-MCNC: 4.5 MMOL/L — SIGNIFICANT CHANGE UP (ref 3.5–5.3)
POTASSIUM SERPL-MCNC: 4.8 MMOL/L — SIGNIFICANT CHANGE UP (ref 3.5–5.3)
POTASSIUM SERPL-MCNC: SIGNIFICANT CHANGE UP (ref 3.5–5.3)
POTASSIUM SERPL-SCNC: 2.8 MMOL/L — CRITICAL LOW (ref 3.5–5.3)
POTASSIUM SERPL-SCNC: 2.9 MMOL/L — CRITICAL LOW (ref 3.5–5.3)
POTASSIUM SERPL-SCNC: 3.1 MMOL/L — LOW (ref 3.5–5.3)
POTASSIUM SERPL-SCNC: 3.2 MMOL/L — LOW (ref 3.5–5.3)
POTASSIUM SERPL-SCNC: 3.2 MMOL/L — LOW (ref 3.5–5.3)
POTASSIUM SERPL-SCNC: 3.3 MMOL/L — LOW (ref 3.5–5.3)
POTASSIUM SERPL-SCNC: 3.4 MMOL/L — LOW (ref 3.5–5.3)
POTASSIUM SERPL-SCNC: 3.5 MMOL/L — SIGNIFICANT CHANGE UP (ref 3.5–5.3)
POTASSIUM SERPL-SCNC: 3.5 MMOL/L — SIGNIFICANT CHANGE UP (ref 3.5–5.3)
POTASSIUM SERPL-SCNC: 3.6 MMOL/L — SIGNIFICANT CHANGE UP (ref 3.5–5.3)
POTASSIUM SERPL-SCNC: 3.8 MMOL/L — SIGNIFICANT CHANGE UP (ref 3.5–5.3)
POTASSIUM SERPL-SCNC: 3.8 MMOL/L — SIGNIFICANT CHANGE UP (ref 3.5–5.3)
POTASSIUM SERPL-SCNC: 4 MMOL/L — SIGNIFICANT CHANGE UP (ref 3.5–5.3)
POTASSIUM SERPL-SCNC: 4 MMOL/L — SIGNIFICANT CHANGE UP (ref 3.5–5.3)
POTASSIUM SERPL-SCNC: 4.1 MMOL/L — SIGNIFICANT CHANGE UP (ref 3.5–5.3)
POTASSIUM SERPL-SCNC: 4.4 MMOL/L — SIGNIFICANT CHANGE UP (ref 3.5–5.3)
POTASSIUM SERPL-SCNC: 4.4 MMOL/L — SIGNIFICANT CHANGE UP (ref 3.5–5.3)
POTASSIUM SERPL-SCNC: 4.5 MMOL/L — SIGNIFICANT CHANGE UP (ref 3.5–5.3)
POTASSIUM SERPL-SCNC: 4.8 MMOL/L — SIGNIFICANT CHANGE UP (ref 3.5–5.3)
POTASSIUM SERPL-SCNC: SIGNIFICANT CHANGE UP (ref 3.5–5.3)
PROCALCITONIN SERPL-MCNC: 0.58 NG/ML — HIGH (ref 0.02–0.1)
PROCALCITONIN SERPL-MCNC: 0.75 NG/ML — HIGH (ref 0.02–0.1)
PROCALCITONIN SERPL-MCNC: 0.8 NG/ML — HIGH (ref 0.02–0.1)
PROT CSF-MCNC: 128 MG/DL — HIGH (ref 15–45)
PROT SERPL-MCNC: 5.5 G/DL — LOW (ref 6–8.3)
PROT SERPL-MCNC: 6 G/DL — SIGNIFICANT CHANGE UP (ref 6–8.3)
PROT SERPL-MCNC: 6.5 G/DL — SIGNIFICANT CHANGE UP (ref 6–8.3)
PROT SERPL-MCNC: 7.1 G/DL — SIGNIFICANT CHANGE UP (ref 6–8.3)
PROT SERPL-MCNC: 7.3 G/DL — SIGNIFICANT CHANGE UP (ref 6–8.3)
PROT SERPL-MCNC: 7.4 G/DL — SIGNIFICANT CHANGE UP (ref 6–8.3)
PROT UR-MCNC: NEGATIVE MG/DL — SIGNIFICANT CHANGE UP
PROTHROM AB SERPL-ACNC: 11.2 SEC — SIGNIFICANT CHANGE UP (ref 9.5–13)
PROTHROM AB SERPL-ACNC: 11.9 SEC — SIGNIFICANT CHANGE UP (ref 9.5–13)
RBC # BLD: 3.11 M/UL — LOW (ref 3.8–5.2)
RBC # BLD: 3.22 M/UL — LOW (ref 3.8–5.2)
RBC # BLD: 3.58 M/UL — LOW (ref 3.8–5.2)
RBC # BLD: 4.37 M/UL — SIGNIFICANT CHANGE UP (ref 3.8–5.2)
RBC # BLD: 4.37 M/UL — SIGNIFICANT CHANGE UP (ref 3.8–5.2)
RBC # BLD: 4.5 M/UL — SIGNIFICANT CHANGE UP (ref 3.8–5.2)
RBC # BLD: 4.74 M/UL — SIGNIFICANT CHANGE UP (ref 3.8–5.2)
RBC # BLD: 4.83 M/UL — SIGNIFICANT CHANGE UP (ref 3.8–5.2)
RBC # BLD: 5.01 M/UL — SIGNIFICANT CHANGE UP (ref 3.8–5.2)
RBC # CSF: HIGH /UL (ref 0–0)
RBC # FLD: 12.7 % — SIGNIFICANT CHANGE UP (ref 10.3–14.5)
RBC # FLD: 12.8 % — SIGNIFICANT CHANGE UP (ref 10.3–14.5)
RBC # FLD: 13.1 % — SIGNIFICANT CHANGE UP (ref 10.3–14.5)
RBC # FLD: 13.4 % — SIGNIFICANT CHANGE UP (ref 10.3–14.5)
RBC # FLD: 13.7 % — SIGNIFICANT CHANGE UP (ref 10.3–14.5)
RBC # FLD: 14.2 % — SIGNIFICANT CHANGE UP (ref 10.3–14.5)
RBC # FLD: 14.6 % — HIGH (ref 10.3–14.5)
RBC # FLD: 14.7 % — HIGH (ref 10.3–14.5)
RBC # FLD: 14.9 % — HIGH (ref 10.3–14.5)
RBC CASTS # UR COMP ASSIST: < 5 /HPF — SIGNIFICANT CHANGE UP
RH IG SCN BLD-IMP: POSITIVE — SIGNIFICANT CHANGE UP
SAO2 % BLDA: 100 % — HIGH (ref 94–98)
SAO2 % BLDA: 98.8 % — HIGH (ref 94–98)
SAO2 % BLDA: 98.8 % — HIGH (ref 94–98)
SAO2 % BLDA: 99.7 % — HIGH (ref 94–98)
SARS-COV-2 RNA SPEC QL NAA+PROBE: SIGNIFICANT CHANGE UP
SODIUM SERPL-SCNC: 139 MMOL/L — SIGNIFICANT CHANGE UP (ref 135–145)
SODIUM SERPL-SCNC: 139 MMOL/L — SIGNIFICANT CHANGE UP (ref 135–145)
SODIUM SERPL-SCNC: 152 MMOL/L — HIGH (ref 135–145)
SODIUM SERPL-SCNC: 156 MMOL/L — HIGH (ref 135–145)
SODIUM SERPL-SCNC: 157 MMOL/L — HIGH (ref 135–145)
SODIUM SERPL-SCNC: 158 MMOL/L — HIGH (ref 135–145)
SODIUM SERPL-SCNC: 159 MMOL/L — HIGH (ref 135–145)
SODIUM SERPL-SCNC: 160 MMOL/L — CRITICAL HIGH (ref 135–145)
SODIUM SERPL-SCNC: 162 MMOL/L — CRITICAL HIGH (ref 135–145)
SODIUM SERPL-SCNC: 162 MMOL/L — CRITICAL HIGH (ref 135–145)
SODIUM SERPL-SCNC: 164 MMOL/L — CRITICAL HIGH (ref 135–145)
SODIUM SERPL-SCNC: 165 MMOL/L — CRITICAL HIGH (ref 135–145)
SODIUM SERPL-SCNC: 166 MMOL/L — CRITICAL HIGH (ref 135–145)
SODIUM SERPL-SCNC: 166 MMOL/L — CRITICAL HIGH (ref 135–145)
SODIUM SERPL-SCNC: 167 MMOL/L — CRITICAL HIGH (ref 135–145)
SODIUM SERPL-SCNC: 168 MMOL/L — CRITICAL HIGH (ref 135–145)
SODIUM UR-SCNC: 20 MMOL/L — SIGNIFICANT CHANGE UP
SP GR SPEC: <=1.005 — SIGNIFICANT CHANGE UP (ref 1–1.03)
SPECIMEN SOURCE: SIGNIFICANT CHANGE UP
TRIGL SERPL-MCNC: 180 MG/DL — HIGH
TRIGL SERPL-MCNC: 307 MG/DL — HIGH
TROPONIN T, HIGH SENSITIVITY RESULT: 212 NG/L — CRITICAL HIGH (ref 0–51)
TROPONIN T, HIGH SENSITIVITY RESULT: 334 NG/L — CRITICAL HIGH (ref 0–51)
TROPONIN T, HIGH SENSITIVITY RESULT: 429 NG/L — CRITICAL HIGH (ref 0–51)
TROPONIN T, HIGH SENSITIVITY RESULT: 495 NG/L — CRITICAL HIGH (ref 0–51)
TROPONIN T, HIGH SENSITIVITY RESULT: 633 NG/L — CRITICAL HIGH (ref 0–51)
TSH SERPL-MCNC: 0.98 UIU/ML — SIGNIFICANT CHANGE UP (ref 0.27–4.2)
TUBE TYPE: SIGNIFICANT CHANGE UP
UROBILINOGEN FLD QL: 0.2 E.U./DL — SIGNIFICANT CHANGE UP
WBC # BLD: 10.56 K/UL — HIGH (ref 3.8–10.5)
WBC # BLD: 12.02 K/UL — HIGH (ref 3.8–10.5)
WBC # BLD: 14 K/UL — HIGH (ref 3.8–10.5)
WBC # BLD: 14.58 K/UL — HIGH (ref 3.8–10.5)
WBC # BLD: 15.3 K/UL — HIGH (ref 3.8–10.5)
WBC # BLD: 15.93 K/UL — HIGH (ref 3.8–10.5)
WBC # BLD: 18.85 K/UL — HIGH (ref 3.8–10.5)
WBC # BLD: 19.32 K/UL — HIGH (ref 3.8–10.5)
WBC # BLD: 8.64 K/UL — SIGNIFICANT CHANGE UP (ref 3.8–10.5)
WBC # FLD AUTO: 10.56 K/UL — HIGH (ref 3.8–10.5)
WBC # FLD AUTO: 12.02 K/UL — HIGH (ref 3.8–10.5)
WBC # FLD AUTO: 14 K/UL — HIGH (ref 3.8–10.5)
WBC # FLD AUTO: 14.58 K/UL — HIGH (ref 3.8–10.5)
WBC # FLD AUTO: 15.3 K/UL — HIGH (ref 3.8–10.5)
WBC # FLD AUTO: 15.93 K/UL — HIGH (ref 3.8–10.5)
WBC # FLD AUTO: 18.85 K/UL — HIGH (ref 3.8–10.5)
WBC # FLD AUTO: 19.32 K/UL — HIGH (ref 3.8–10.5)
WBC # FLD AUTO: 8.64 K/UL — SIGNIFICANT CHANGE UP (ref 3.8–10.5)
WBC UR QL: < 5 /HPF — SIGNIFICANT CHANGE UP

## 2023-01-01 PROCEDURE — 70450 CT HEAD/BRAIN W/O DYE: CPT | Mod: 26,77

## 2023-01-01 PROCEDURE — 76937 US GUIDE VASCULAR ACCESS: CPT | Mod: 26

## 2023-01-01 PROCEDURE — 99291 CRITICAL CARE FIRST HOUR: CPT

## 2023-01-01 PROCEDURE — 95720 EEG PHY/QHP EA INCR W/VEEG: CPT

## 2023-01-01 PROCEDURE — 71045 X-RAY EXAM CHEST 1 VIEW: CPT | Mod: 26

## 2023-01-01 PROCEDURE — 99231 SBSQ HOSP IP/OBS SF/LOW 25: CPT

## 2023-01-01 PROCEDURE — 99232 SBSQ HOSP IP/OBS MODERATE 35: CPT

## 2023-01-01 PROCEDURE — 36569 INSJ PICC 5 YR+ W/O IMAGING: CPT

## 2023-01-01 PROCEDURE — 75894 X-RAYS TRANSCATH THERAPY: CPT | Mod: 26

## 2023-01-01 PROCEDURE — 36620 INSERTION CATHETER ARTERY: CPT

## 2023-01-01 PROCEDURE — 61624 TCAT PERM OCCLS/EMBOLJ CNS: CPT

## 2023-01-01 PROCEDURE — 36226 PLACE CATH VERTEBRAL ART: CPT | Mod: LT

## 2023-01-01 PROCEDURE — 93308 TTE F-UP OR LMTD: CPT | Mod: 26

## 2023-01-01 PROCEDURE — 70450 CT HEAD/BRAIN W/O DYE: CPT | Mod: 26

## 2023-01-01 PROCEDURE — 99497 ADVNCD CARE PLAN 30 MIN: CPT | Mod: 25

## 2023-01-01 PROCEDURE — 99253 IP/OBS CNSLTJ NEW/EST LOW 45: CPT

## 2023-01-01 PROCEDURE — 95718 EEG PHYS/QHP 2-12 HR W/VEEG: CPT

## 2023-01-01 PROCEDURE — 99223 1ST HOSP IP/OBS HIGH 75: CPT

## 2023-01-01 PROCEDURE — 76377 3D RENDER W/INTRP POSTPROCES: CPT | Mod: 26

## 2023-01-01 PROCEDURE — 99254 IP/OBS CNSLTJ NEW/EST MOD 60: CPT | Mod: GC,25

## 2023-01-01 PROCEDURE — 61107 TDH PNXR IMPLT VENTR CATH: CPT

## 2023-01-01 PROCEDURE — 71045 X-RAY EXAM CHEST 1 VIEW: CPT | Mod: 26,77

## 2023-01-01 PROCEDURE — 93306 TTE W/DOPPLER COMPLETE: CPT | Mod: 26

## 2023-01-01 PROCEDURE — 93970 EXTREMITY STUDY: CPT | Mod: 26

## 2023-01-01 PROCEDURE — 75898 FOLLOW-UP ANGIOGRAPHY: CPT | Mod: 26

## 2023-01-01 PROCEDURE — 99232 SBSQ HOSP IP/OBS MODERATE 35: CPT | Mod: GC

## 2023-01-01 PROCEDURE — 99254 IP/OBS CNSLTJ NEW/EST MOD 60: CPT | Mod: GC

## 2023-01-01 PROCEDURE — 36225 PLACE CATH SUBCLAVIAN ART: CPT | Mod: RT,59

## 2023-01-01 PROCEDURE — 76937 US GUIDE VASCULAR ACCESS: CPT | Mod: 26,59

## 2023-01-01 PROCEDURE — 36224 PLACE CATH CAROTD ART: CPT | Mod: 50

## 2023-01-01 PROCEDURE — 74018 RADEX ABDOMEN 1 VIEW: CPT | Mod: 26

## 2023-01-01 RX ORDER — SODIUM CHLORIDE 9 MG/ML
1000 INJECTION, SOLUTION INTRAVENOUS
Refills: 0 | Status: DISCONTINUED | OUTPATIENT
Start: 2023-01-01 | End: 2023-01-01

## 2023-01-01 RX ORDER — ACETAMINOPHEN 500 MG
1000 TABLET ORAL ONCE
Refills: 0 | Status: COMPLETED | OUTPATIENT
Start: 2023-01-01 | End: 2023-01-01

## 2023-01-01 RX ORDER — HEPARIN SODIUM 5000 [USP'U]/ML
5000 INJECTION INTRAVENOUS; SUBCUTANEOUS EVERY 8 HOURS
Refills: 0 | Status: DISCONTINUED | OUTPATIENT
Start: 2023-01-01 | End: 2023-01-01

## 2023-01-01 RX ORDER — POTASSIUM PHOSPHATE, MONOBASIC POTASSIUM PHOSPHATE, DIBASIC 236; 224 MG/ML; MG/ML
30 INJECTION, SOLUTION INTRAVENOUS ONCE
Refills: 0 | Status: COMPLETED | OUTPATIENT
Start: 2023-01-01 | End: 2023-01-01

## 2023-01-01 RX ORDER — SODIUM CHLORIDE 9 MG/ML
1000 INJECTION INTRAMUSCULAR; INTRAVENOUS; SUBCUTANEOUS
Refills: 0 | Status: DISCONTINUED | OUTPATIENT
Start: 2023-01-01 | End: 2023-01-01

## 2023-01-01 RX ORDER — ACETAMINOPHEN 500 MG
650 TABLET ORAL EVERY 6 HOURS
Refills: 0 | Status: DISCONTINUED | OUTPATIENT
Start: 2023-01-01 | End: 2023-01-01

## 2023-01-01 RX ORDER — DEXTROSE 50 % IN WATER 50 %
25 SYRINGE (ML) INTRAVENOUS ONCE
Refills: 0 | Status: DISCONTINUED | OUTPATIENT
Start: 2023-01-01 | End: 2023-01-01

## 2023-01-01 RX ORDER — INSULIN LISPRO 100/ML
VIAL (ML) SUBCUTANEOUS EVERY 6 HOURS
Refills: 0 | Status: DISCONTINUED | OUTPATIENT
Start: 2023-01-01 | End: 2023-01-01

## 2023-01-01 RX ORDER — NIMODIPINE 60 MG/10ML
60 SOLUTION ORAL EVERY 4 HOURS
Refills: 0 | Status: DISCONTINUED | OUTPATIENT
Start: 2023-01-01 | End: 2023-01-01

## 2023-01-01 RX ORDER — CEFTRIAXONE 500 MG/1
2000 INJECTION, POWDER, FOR SOLUTION INTRAMUSCULAR; INTRAVENOUS EVERY 12 HOURS
Refills: 0 | Status: DISCONTINUED | OUTPATIENT
Start: 2023-01-01 | End: 2023-01-01

## 2023-01-01 RX ORDER — NOREPINEPHRINE BITARTRATE/D5W 8 MG/250ML
0.05 PLASTIC BAG, INJECTION (ML) INTRAVENOUS
Qty: 8 | Refills: 0 | Status: DISCONTINUED | OUTPATIENT
Start: 2023-01-01 | End: 2023-01-01

## 2023-01-01 RX ORDER — INSULIN HUMAN 100 [IU]/ML
14 INJECTION, SOLUTION SUBCUTANEOUS
Qty: 100 | Refills: 0 | Status: DISCONTINUED | OUTPATIENT
Start: 2023-01-01 | End: 2023-01-01

## 2023-01-01 RX ORDER — INSULIN LISPRO 100/ML
VIAL (ML) SUBCUTANEOUS
Refills: 0 | Status: DISCONTINUED | OUTPATIENT
Start: 2023-01-01 | End: 2023-01-01

## 2023-01-01 RX ORDER — DESMOPRESSIN ACETATE 0.1 MG/1
0.1 TABLET ORAL ONCE
Refills: 0 | Status: DISCONTINUED | OUTPATIENT
Start: 2023-01-01 | End: 2023-01-01

## 2023-01-01 RX ORDER — SODIUM CHLORIDE 9 MG/ML
1000 INJECTION, SOLUTION INTRAVENOUS ONCE
Refills: 0 | Status: COMPLETED | OUTPATIENT
Start: 2023-01-01 | End: 2023-01-01

## 2023-01-01 RX ORDER — CEFAZOLIN SODIUM 1 G
2000 VIAL (EA) INJECTION ONCE
Refills: 0 | Status: COMPLETED | OUTPATIENT
Start: 2023-01-01 | End: 2023-01-01

## 2023-01-01 RX ORDER — MANNITOL
90 POWDER (GRAM) MISCELLANEOUS ONCE
Refills: 0 | Status: COMPLETED | OUTPATIENT
Start: 2023-01-01 | End: 2023-01-01

## 2023-01-01 RX ORDER — PANTOPRAZOLE SODIUM 20 MG/1
40 TABLET, DELAYED RELEASE ORAL DAILY
Refills: 0 | Status: DISCONTINUED | OUTPATIENT
Start: 2023-01-01 | End: 2023-01-01

## 2023-01-01 RX ORDER — SODIUM CHLORIDE 9 MG/ML
1000 INJECTION, SOLUTION INTRAVENOUS ONCE
Refills: 0 | Status: DISCONTINUED | OUTPATIENT
Start: 2023-01-01 | End: 2023-01-01

## 2023-01-01 RX ORDER — FENTANYL CITRATE 50 UG/ML
25 INJECTION INTRAVENOUS ONCE
Refills: 0 | Status: DISCONTINUED | OUTPATIENT
Start: 2023-01-01 | End: 2023-01-01

## 2023-01-01 RX ORDER — FENTANYL CITRATE 50 UG/ML
75 INJECTION INTRAVENOUS ONCE
Refills: 0 | Status: DISCONTINUED | OUTPATIENT
Start: 2023-01-01 | End: 2023-01-01

## 2023-01-01 RX ORDER — CHLORHEXIDINE GLUCONATE 213 G/1000ML
15 SOLUTION TOPICAL EVERY 12 HOURS
Refills: 0 | Status: DISCONTINUED | OUTPATIENT
Start: 2023-01-01 | End: 2023-01-01

## 2023-01-01 RX ORDER — FENTANYL CITRATE 50 UG/ML
50 INJECTION INTRAVENOUS
Refills: 0 | Status: DISCONTINUED | OUTPATIENT
Start: 2023-01-01 | End: 2023-01-01

## 2023-01-01 RX ORDER — MANNITOL
50 POWDER (GRAM) MISCELLANEOUS ONCE
Refills: 0 | Status: COMPLETED | OUTPATIENT
Start: 2023-01-01 | End: 2023-01-01

## 2023-01-01 RX ORDER — MIDAZOLAM HYDROCHLORIDE 1 MG/ML
2 INJECTION, SOLUTION INTRAMUSCULAR; INTRAVENOUS ONCE
Refills: 0 | Status: DISCONTINUED | OUTPATIENT
Start: 2023-01-01 | End: 2023-01-01

## 2023-01-01 RX ORDER — INSULIN HUMAN 100 [IU]/ML
3 INJECTION, SOLUTION SUBCUTANEOUS
Qty: 100 | Refills: 0 | Status: DISCONTINUED | OUTPATIENT
Start: 2023-01-01 | End: 2023-01-01

## 2023-01-01 RX ORDER — PANTOPRAZOLE SODIUM 20 MG/1
40 TABLET, DELAYED RELEASE ORAL
Refills: 0 | Status: DISCONTINUED | OUTPATIENT
Start: 2023-01-01 | End: 2023-01-01

## 2023-01-01 RX ORDER — PHENYLEPHRINE HYDROCHLORIDE 10 MG/ML
0.1 INJECTION INTRAVENOUS
Qty: 40 | Refills: 0 | Status: DISCONTINUED | OUTPATIENT
Start: 2023-01-01 | End: 2023-01-01

## 2023-01-01 RX ORDER — HUMAN INSULIN 100 [IU]/ML
10 INJECTION, SUSPENSION SUBCUTANEOUS ONCE
Refills: 0 | Status: COMPLETED | OUTPATIENT
Start: 2023-01-01 | End: 2023-01-01

## 2023-01-01 RX ORDER — HUMAN INSULIN 100 [IU]/ML
25 INJECTION, SUSPENSION SUBCUTANEOUS EVERY 6 HOURS
Refills: 0 | Status: DISCONTINUED | OUTPATIENT
Start: 2023-01-01 | End: 2023-01-01

## 2023-01-01 RX ORDER — MAGNESIUM SULFATE 500 MG/ML
4 VIAL (ML) INJECTION ONCE
Refills: 0 | Status: COMPLETED | OUTPATIENT
Start: 2023-01-01 | End: 2023-01-01

## 2023-01-01 RX ORDER — LEVETIRACETAM 250 MG/1
500 TABLET, FILM COATED ORAL EVERY 12 HOURS
Refills: 0 | Status: DISCONTINUED | OUTPATIENT
Start: 2023-01-01 | End: 2023-01-01

## 2023-01-01 RX ORDER — POTASSIUM CHLORIDE 20 MEQ
40 PACKET (EA) ORAL EVERY 4 HOURS
Refills: 0 | Status: DISCONTINUED | OUTPATIENT
Start: 2023-01-01 | End: 2023-01-01

## 2023-01-01 RX ORDER — SENNA PLUS 8.6 MG/1
2 TABLET ORAL AT BEDTIME
Refills: 0 | Status: DISCONTINUED | OUTPATIENT
Start: 2023-01-01 | End: 2023-01-01

## 2023-01-01 RX ORDER — SENNA PLUS 8.6 MG/1
2 TABLET ORAL
Refills: 0 | Status: DISCONTINUED | OUTPATIENT
Start: 2023-01-01 | End: 2023-01-01

## 2023-01-01 RX ORDER — FENTANYL CITRATE 50 UG/ML
50 INJECTION INTRAVENOUS ONCE
Refills: 0 | Status: DISCONTINUED | OUTPATIENT
Start: 2023-01-01 | End: 2023-01-01

## 2023-01-01 RX ORDER — POTASSIUM CHLORIDE 20 MEQ
20 PACKET (EA) ORAL
Refills: 0 | Status: COMPLETED | OUTPATIENT
Start: 2023-01-01 | End: 2023-01-01

## 2023-01-01 RX ORDER — PROPOFOL 10 MG/ML
10 INJECTION, EMULSION INTRAVENOUS
Qty: 1000 | Refills: 0 | Status: DISCONTINUED | OUTPATIENT
Start: 2023-01-01 | End: 2023-01-01

## 2023-01-01 RX ORDER — DEXTROSE 50 % IN WATER 50 %
50 SYRINGE (ML) INTRAVENOUS
Refills: 0 | Status: DISCONTINUED | OUTPATIENT
Start: 2023-01-01 | End: 2023-01-01

## 2023-01-01 RX ORDER — INSULIN HUMAN 100 [IU]/ML
8 INJECTION, SOLUTION SUBCUTANEOUS
Qty: 100 | Refills: 0 | Status: DISCONTINUED | OUTPATIENT
Start: 2023-01-01 | End: 2023-01-01

## 2023-01-01 RX ORDER — SUCRALFATE 1 G
1 TABLET ORAL EVERY 6 HOURS
Refills: 0 | Status: DISCONTINUED | OUTPATIENT
Start: 2023-01-01 | End: 2023-01-01

## 2023-01-01 RX ORDER — POTASSIUM CHLORIDE 20 MEQ
40 PACKET (EA) ORAL ONCE
Refills: 0 | Status: COMPLETED | OUTPATIENT
Start: 2023-01-01 | End: 2023-01-01

## 2023-01-01 RX ORDER — DEXTROSE 50 % IN WATER 50 %
15 SYRINGE (ML) INTRAVENOUS ONCE
Refills: 0 | Status: DISCONTINUED | OUTPATIENT
Start: 2023-01-01 | End: 2023-01-01

## 2023-01-01 RX ORDER — SODIUM CHLORIDE 9 MG/ML
1000 INJECTION INTRAMUSCULAR; INTRAVENOUS; SUBCUTANEOUS
Refills: 0 | Status: COMPLETED | OUTPATIENT
Start: 2023-01-01 | End: 2023-01-01

## 2023-01-01 RX ORDER — SODIUM CHLORIDE 9 MG/ML
1000 INJECTION INTRAMUSCULAR; INTRAVENOUS; SUBCUTANEOUS ONCE
Refills: 0 | Status: COMPLETED | OUTPATIENT
Start: 2023-01-01 | End: 2023-01-01

## 2023-01-01 RX ORDER — MIDAZOLAM HYDROCHLORIDE 1 MG/ML
1 INJECTION, SOLUTION INTRAMUSCULAR; INTRAVENOUS ONCE
Refills: 0 | Status: DISCONTINUED | OUTPATIENT
Start: 2023-01-01 | End: 2023-01-01

## 2023-01-01 RX ORDER — INSULIN GLARGINE 100 [IU]/ML
16 INJECTION, SOLUTION SUBCUTANEOUS AT BEDTIME
Refills: 0 | Status: DISCONTINUED | OUTPATIENT
Start: 2023-01-01 | End: 2023-01-01

## 2023-01-01 RX ORDER — DESMOPRESSIN ACETATE 0.1 MG/1
0.25 TABLET ORAL ONCE
Refills: 0 | Status: COMPLETED | OUTPATIENT
Start: 2023-01-01 | End: 2023-01-01

## 2023-01-01 RX ORDER — POLYETHYLENE GLYCOL 3350 17 G/17G
17 POWDER, FOR SOLUTION ORAL
Refills: 0 | Status: DISCONTINUED | OUTPATIENT
Start: 2023-01-01 | End: 2023-01-01

## 2023-01-01 RX ORDER — POTASSIUM PHOSPHATE, MONOBASIC POTASSIUM PHOSPHATE, DIBASIC 236; 224 MG/ML; MG/ML
15 INJECTION, SOLUTION INTRAVENOUS ONCE
Refills: 0 | Status: COMPLETED | OUTPATIENT
Start: 2023-01-01 | End: 2023-01-01

## 2023-01-01 RX ORDER — SODIUM CHLORIDE 9 MG/ML
500 INJECTION, SOLUTION INTRAVENOUS
Refills: 0 | Status: DISCONTINUED | OUTPATIENT
Start: 2023-01-01 | End: 2023-01-01

## 2023-01-01 RX ORDER — SODIUM CHLORIDE 9 MG/ML
1000 INJECTION INTRAMUSCULAR; INTRAVENOUS; SUBCUTANEOUS ONCE
Refills: 0 | Status: DISCONTINUED | OUTPATIENT
Start: 2023-01-01 | End: 2023-01-01

## 2023-01-01 RX ORDER — SODIUM CHLORIDE 9 MG/ML
10 INJECTION INTRAMUSCULAR; INTRAVENOUS; SUBCUTANEOUS
Refills: 0 | Status: DISCONTINUED | OUTPATIENT
Start: 2023-01-01 | End: 2023-01-01

## 2023-01-01 RX ORDER — ATORVASTATIN CALCIUM 80 MG/1
80 TABLET, FILM COATED ORAL AT BEDTIME
Refills: 0 | Status: DISCONTINUED | OUTPATIENT
Start: 2023-01-01 | End: 2023-01-01

## 2023-01-01 RX ORDER — DEXTROSE 50 % IN WATER 50 %
12.5 SYRINGE (ML) INTRAVENOUS ONCE
Refills: 0 | Status: DISCONTINUED | OUTPATIENT
Start: 2023-01-01 | End: 2023-01-01

## 2023-01-01 RX ORDER — SODIUM CHLORIDE 9 MG/ML
2000 INJECTION, SOLUTION INTRAVENOUS ONCE
Refills: 0 | Status: COMPLETED | OUTPATIENT
Start: 2023-01-01 | End: 2023-01-01

## 2023-01-01 RX ORDER — CHLORHEXIDINE GLUCONATE 213 G/1000ML
1 SOLUTION TOPICAL
Refills: 0 | Status: DISCONTINUED | OUTPATIENT
Start: 2023-01-01 | End: 2023-01-01

## 2023-01-01 RX ORDER — GLUCAGON INJECTION, SOLUTION 0.5 MG/.1ML
1 INJECTION, SOLUTION SUBCUTANEOUS ONCE
Refills: 0 | Status: DISCONTINUED | OUTPATIENT
Start: 2023-01-01 | End: 2023-01-01

## 2023-01-01 RX ORDER — HUMAN INSULIN 100 [IU]/ML
10 INJECTION, SUSPENSION SUBCUTANEOUS EVERY 6 HOURS
Refills: 0 | Status: DISCONTINUED | OUTPATIENT
Start: 2023-01-01 | End: 2023-01-01

## 2023-01-01 RX ORDER — SODIUM CHLORIDE 9 MG/ML
500 INJECTION, SOLUTION INTRAVENOUS ONCE
Refills: 0 | Status: COMPLETED | OUTPATIENT
Start: 2023-01-01 | End: 2023-01-01

## 2023-01-01 RX ORDER — MANNITOL
20 POWDER (GRAM) MISCELLANEOUS ONCE
Refills: 0 | Status: DISCONTINUED | OUTPATIENT
Start: 2023-01-01 | End: 2023-01-01

## 2023-01-01 RX ADMIN — PHENYLEPHRINE HYDROCHLORIDE 3.38 MICROGRAM(S)/KG/MIN: 10 INJECTION INTRAVENOUS at 22:56

## 2023-01-01 RX ADMIN — FENTANYL CITRATE 50 MICROGRAM(S): 50 INJECTION INTRAVENOUS at 01:29

## 2023-01-01 RX ADMIN — CEFTRIAXONE 100 MILLIGRAM(S): 500 INJECTION, POWDER, FOR SOLUTION INTRAMUSCULAR; INTRAVENOUS at 03:22

## 2023-01-01 RX ADMIN — Medication 8: at 17:41

## 2023-01-01 RX ADMIN — FENTANYL CITRATE 50 MICROGRAM(S): 50 INJECTION INTRAVENOUS at 19:37

## 2023-01-01 RX ADMIN — Medication 650 MILLIGRAM(S): at 22:31

## 2023-01-01 RX ADMIN — HEPARIN SODIUM 5000 UNIT(S): 5000 INJECTION INTRAVENOUS; SUBCUTANEOUS at 22:28

## 2023-01-01 RX ADMIN — Medication 4: at 12:02

## 2023-01-01 RX ADMIN — CHLORHEXIDINE GLUCONATE 15 MILLILITER(S): 213 SOLUTION TOPICAL at 19:04

## 2023-01-01 RX ADMIN — SODIUM CHLORIDE 100 MILLILITER(S): 9 INJECTION, SOLUTION INTRAVENOUS at 09:29

## 2023-01-01 RX ADMIN — Medication 1 GRAM(S): at 12:40

## 2023-01-01 RX ADMIN — Medication 1000 MILLIGRAM(S): at 00:00

## 2023-01-01 RX ADMIN — HEPARIN SODIUM 5000 UNIT(S): 5000 INJECTION INTRAVENOUS; SUBCUTANEOUS at 06:07

## 2023-01-01 RX ADMIN — PANTOPRAZOLE SODIUM 40 MILLIGRAM(S): 20 TABLET, DELAYED RELEASE ORAL at 06:07

## 2023-01-01 RX ADMIN — CHLORHEXIDINE GLUCONATE 1 APPLICATION(S): 213 SOLUTION TOPICAL at 23:17

## 2023-01-01 RX ADMIN — NIMODIPINE 60 MILLIGRAM(S): 60 SOLUTION ORAL at 17:04

## 2023-01-01 RX ADMIN — SODIUM CHLORIDE 1000 MILLILITER(S): 9 INJECTION, SOLUTION INTRAVENOUS at 07:30

## 2023-01-01 RX ADMIN — LEVETIRACETAM 400 MILLIGRAM(S): 250 TABLET, FILM COATED ORAL at 17:04

## 2023-01-01 RX ADMIN — Medication 50 MILLIEQUIVALENT(S): at 09:54

## 2023-01-01 RX ADMIN — Medication 40 MILLIEQUIVALENT(S): at 16:01

## 2023-01-01 RX ADMIN — MIDAZOLAM HYDROCHLORIDE 1 MILLIGRAM(S): 1 INJECTION, SOLUTION INTRAMUSCULAR; INTRAVENOUS at 18:01

## 2023-01-01 RX ADMIN — ATORVASTATIN CALCIUM 80 MILLIGRAM(S): 80 TABLET, FILM COATED ORAL at 22:28

## 2023-01-01 RX ADMIN — NIMODIPINE 60 MILLIGRAM(S): 60 SOLUTION ORAL at 10:00

## 2023-01-01 RX ADMIN — Medication 500 GRAM(S): at 02:58

## 2023-01-01 RX ADMIN — NIMODIPINE 60 MILLIGRAM(S): 60 SOLUTION ORAL at 01:03

## 2023-01-01 RX ADMIN — Medication 4: at 23:45

## 2023-01-01 RX ADMIN — POTASSIUM PHOSPHATE, MONOBASIC POTASSIUM PHOSPHATE, DIBASIC 83.33 MILLIMOLE(S): 236; 224 INJECTION, SOLUTION INTRAVENOUS at 13:23

## 2023-01-01 RX ADMIN — Medication 650 MILLIGRAM(S): at 22:35

## 2023-01-01 RX ADMIN — NIMODIPINE 60 MILLIGRAM(S): 60 SOLUTION ORAL at 22:32

## 2023-01-01 RX ADMIN — PANTOPRAZOLE SODIUM 40 MILLIGRAM(S): 20 TABLET, DELAYED RELEASE ORAL at 07:20

## 2023-01-01 RX ADMIN — FENTANYL CITRATE 50 MICROGRAM(S): 50 INJECTION INTRAVENOUS at 19:19

## 2023-01-01 RX ADMIN — CHLORHEXIDINE GLUCONATE 15 MILLILITER(S): 213 SOLUTION TOPICAL at 17:03

## 2023-01-01 RX ADMIN — Medication 500 GRAM(S): at 20:30

## 2023-01-01 RX ADMIN — Medication 450 GRAM(S): at 05:00

## 2023-01-01 RX ADMIN — POLYETHYLENE GLYCOL 3350 17 GRAM(S): 17 POWDER, FOR SOLUTION ORAL at 08:17

## 2023-01-01 RX ADMIN — CHLORHEXIDINE GLUCONATE 15 MILLILITER(S): 213 SOLUTION TOPICAL at 17:06

## 2023-01-01 RX ADMIN — CEFTRIAXONE 100 MILLIGRAM(S): 500 INJECTION, POWDER, FOR SOLUTION INTRAMUSCULAR; INTRAVENOUS at 15:49

## 2023-01-01 RX ADMIN — CHLORHEXIDINE GLUCONATE 15 MILLILITER(S): 213 SOLUTION TOPICAL at 17:04

## 2023-01-01 RX ADMIN — Medication 500 GRAM(S): at 11:02

## 2023-01-01 RX ADMIN — SODIUM CHLORIDE 1000 MILLILITER(S): 9 INJECTION, SOLUTION INTRAVENOUS at 03:45

## 2023-01-01 RX ADMIN — Medication 1000 MILLIGRAM(S): at 09:56

## 2023-01-01 RX ADMIN — Medication 8.44 MICROGRAM(S)/KG/MIN: at 16:00

## 2023-01-01 RX ADMIN — Medication 400 MILLIGRAM(S): at 23:00

## 2023-01-01 RX ADMIN — Medication 100 MILLIGRAM(S): at 17:00

## 2023-01-01 RX ADMIN — Medication 4: at 01:05

## 2023-01-01 RX ADMIN — Medication 2: at 11:22

## 2023-01-01 RX ADMIN — SODIUM CHLORIDE 1000 MILLILITER(S): 9 INJECTION, SOLUTION INTRAVENOUS at 22:33

## 2023-01-01 RX ADMIN — CEFTRIAXONE 100 MILLIGRAM(S): 500 INJECTION, POWDER, FOR SOLUTION INTRAMUSCULAR; INTRAVENOUS at 14:11

## 2023-01-01 RX ADMIN — SODIUM CHLORIDE 1000 MILLILITER(S): 9 INJECTION, SOLUTION INTRAVENOUS at 02:58

## 2023-01-01 RX ADMIN — SODIUM CHLORIDE 100 MILLILITER(S): 9 INJECTION, SOLUTION INTRAVENOUS at 01:54

## 2023-01-01 RX ADMIN — SODIUM CHLORIDE 500 MILLILITER(S): 9 INJECTION, SOLUTION INTRAVENOUS at 00:31

## 2023-01-01 RX ADMIN — SODIUM CHLORIDE 1000 MILLILITER(S): 9 INJECTION INTRAMUSCULAR; INTRAVENOUS; SUBCUTANEOUS at 21:45

## 2023-01-01 RX ADMIN — Medication 25 GRAM(S): at 01:53

## 2023-01-01 RX ADMIN — CHLORHEXIDINE GLUCONATE 1 APPLICATION(S): 213 SOLUTION TOPICAL at 05:35

## 2023-01-01 RX ADMIN — Medication 8.44 MICROGRAM(S)/KG/MIN: at 17:54

## 2023-01-01 RX ADMIN — CHLORHEXIDINE GLUCONATE 15 MILLILITER(S): 213 SOLUTION TOPICAL at 07:31

## 2023-01-01 RX ADMIN — SODIUM CHLORIDE 100 MILLILITER(S): 9 INJECTION, SOLUTION INTRAVENOUS at 01:05

## 2023-01-01 RX ADMIN — PHENYLEPHRINE HYDROCHLORIDE 3.38 MICROGRAM(S)/KG/MIN: 10 INJECTION INTRAVENOUS at 03:10

## 2023-01-01 RX ADMIN — SODIUM CHLORIDE 500 MILLILITER(S): 9 INJECTION, SOLUTION INTRAVENOUS at 11:58

## 2023-01-01 RX ADMIN — SODIUM CHLORIDE 100 MILLILITER(S): 9 INJECTION, SOLUTION INTRAVENOUS at 04:47

## 2023-01-01 RX ADMIN — CHLORHEXIDINE GLUCONATE 1 APPLICATION(S): 213 SOLUTION TOPICAL at 06:17

## 2023-01-01 RX ADMIN — NIMODIPINE 60 MILLIGRAM(S): 60 SOLUTION ORAL at 13:35

## 2023-01-01 RX ADMIN — FENTANYL CITRATE 75 MICROGRAM(S): 50 INJECTION INTRAVENOUS at 17:35

## 2023-01-01 RX ADMIN — Medication 400 MILLIGRAM(S): at 07:31

## 2023-01-01 RX ADMIN — Medication 50 MILLIEQUIVALENT(S): at 08:51

## 2023-01-01 RX ADMIN — SODIUM CHLORIDE 500 MILLILITER(S): 9 INJECTION, SOLUTION INTRAVENOUS at 14:39

## 2023-01-01 RX ADMIN — NIMODIPINE 60 MILLIGRAM(S): 60 SOLUTION ORAL at 22:28

## 2023-01-01 RX ADMIN — PANTOPRAZOLE SODIUM 40 MILLIGRAM(S): 20 TABLET, DELAYED RELEASE ORAL at 17:04

## 2023-01-01 RX ADMIN — DESMOPRESSIN ACETATE 0.25 MICROGRAM(S): 0.1 TABLET ORAL at 02:20

## 2023-01-01 RX ADMIN — ATORVASTATIN CALCIUM 80 MILLIGRAM(S): 80 TABLET, FILM COATED ORAL at 22:14

## 2023-01-01 RX ADMIN — SODIUM CHLORIDE 1000 MILLILITER(S): 9 INJECTION, SOLUTION INTRAVENOUS at 20:00

## 2023-01-01 RX ADMIN — SENNA PLUS 2 TABLET(S): 8.6 TABLET ORAL at 22:33

## 2023-01-01 RX ADMIN — Medication 1 GRAM(S): at 23:49

## 2023-01-01 RX ADMIN — CHLORHEXIDINE GLUCONATE 15 MILLILITER(S): 213 SOLUTION TOPICAL at 06:17

## 2023-01-01 RX ADMIN — HEPARIN SODIUM 5000 UNIT(S): 5000 INJECTION INTRAVENOUS; SUBCUTANEOUS at 06:17

## 2023-01-01 RX ADMIN — NIMODIPINE 60 MILLIGRAM(S): 60 SOLUTION ORAL at 17:06

## 2023-01-01 RX ADMIN — CHLORHEXIDINE GLUCONATE 15 MILLILITER(S): 213 SOLUTION TOPICAL at 06:07

## 2023-01-01 RX ADMIN — Medication 2: at 08:39

## 2023-01-01 RX ADMIN — POLYETHYLENE GLYCOL 3350 17 GRAM(S): 17 POWDER, FOR SOLUTION ORAL at 06:17

## 2023-01-01 RX ADMIN — SENNA PLUS 2 TABLET(S): 8.6 TABLET ORAL at 22:29

## 2023-01-01 RX ADMIN — LEVETIRACETAM 400 MILLIGRAM(S): 250 TABLET, FILM COATED ORAL at 06:07

## 2023-01-01 RX ADMIN — SODIUM CHLORIDE 1000 MILLILITER(S): 9 INJECTION, SOLUTION INTRAVENOUS at 09:29

## 2023-01-01 RX ADMIN — Medication 650 MILLIGRAM(S): at 18:40

## 2023-01-01 RX ADMIN — Medication 500 GRAM(S): at 15:38

## 2023-01-01 RX ADMIN — SODIUM CHLORIDE 1000 MILLILITER(S): 9 INJECTION, SOLUTION INTRAVENOUS at 17:58

## 2023-01-01 RX ADMIN — HEPARIN SODIUM 5000 UNIT(S): 5000 INJECTION INTRAVENOUS; SUBCUTANEOUS at 22:53

## 2023-01-01 RX ADMIN — PROPOFOL 5.4 MICROGRAM(S)/KG/MIN: 10 INJECTION, EMULSION INTRAVENOUS at 05:22

## 2023-01-01 RX ADMIN — NIMODIPINE 60 MILLIGRAM(S): 60 SOLUTION ORAL at 06:07

## 2023-01-01 RX ADMIN — CHLORHEXIDINE GLUCONATE 15 MILLILITER(S): 213 SOLUTION TOPICAL at 06:20

## 2023-01-01 RX ADMIN — CEFTRIAXONE 100 MILLIGRAM(S): 500 INJECTION, POWDER, FOR SOLUTION INTRAMUSCULAR; INTRAVENOUS at 14:00

## 2023-01-01 RX ADMIN — Medication 40 MILLIEQUIVALENT(S): at 19:46

## 2023-01-01 RX ADMIN — NIMODIPINE 60 MILLIGRAM(S): 60 SOLUTION ORAL at 01:20

## 2023-01-01 RX ADMIN — HEPARIN SODIUM 5000 UNIT(S): 5000 INJECTION INTRAVENOUS; SUBCUTANEOUS at 13:35

## 2023-01-01 RX ADMIN — HEPARIN SODIUM 5000 UNIT(S): 5000 INJECTION INTRAVENOUS; SUBCUTANEOUS at 14:02

## 2023-01-01 RX ADMIN — POLYETHYLENE GLYCOL 3350 17 GRAM(S): 17 POWDER, FOR SOLUTION ORAL at 17:04

## 2023-01-01 RX ADMIN — Medication 50 MILLIEQUIVALENT(S): at 23:13

## 2023-01-01 RX ADMIN — Medication 40 MILLIEQUIVALENT(S): at 17:52

## 2023-01-01 RX ADMIN — SODIUM CHLORIDE 1000 MILLILITER(S): 9 INJECTION, SOLUTION INTRAVENOUS at 03:30

## 2023-01-01 RX ADMIN — Medication 250 GRAM(S): at 17:58

## 2023-01-01 RX ADMIN — SODIUM CHLORIDE 1000 MILLILITER(S): 9 INJECTION, SOLUTION INTRAVENOUS at 16:00

## 2023-01-01 RX ADMIN — DESMOPRESSIN ACETATE 0.25 MICROGRAM(S): 0.1 TABLET ORAL at 05:22

## 2023-01-01 RX ADMIN — SODIUM CHLORIDE 150 MILLILITER(S): 9 INJECTION, SOLUTION INTRAVENOUS at 22:15

## 2023-01-01 RX ADMIN — LEVETIRACETAM 400 MILLIGRAM(S): 250 TABLET, FILM COATED ORAL at 17:05

## 2023-01-01 RX ADMIN — HUMAN INSULIN 10 UNIT(S): 100 INJECTION, SUSPENSION SUBCUTANEOUS at 12:41

## 2023-01-01 RX ADMIN — LEVETIRACETAM 400 MILLIGRAM(S): 250 TABLET, FILM COATED ORAL at 07:31

## 2023-01-01 RX ADMIN — HEPARIN SODIUM 5000 UNIT(S): 5000 INJECTION INTRAVENOUS; SUBCUTANEOUS at 06:20

## 2023-01-01 RX ADMIN — FENTANYL CITRATE 50 MICROGRAM(S): 50 INJECTION INTRAVENOUS at 18:40

## 2023-01-01 RX ADMIN — INSULIN GLARGINE 16 UNIT(S): 100 INJECTION, SOLUTION SUBCUTANEOUS at 22:28

## 2023-01-01 RX ADMIN — Medication 1000 MILLIGRAM(S): at 13:08

## 2023-01-01 RX ADMIN — HUMAN INSULIN 10 UNIT(S): 100 INJECTION, SUSPENSION SUBCUTANEOUS at 07:33

## 2023-01-01 RX ADMIN — Medication 400 MILLIGRAM(S): at 12:30

## 2023-01-01 RX ADMIN — FENTANYL CITRATE 50 MICROGRAM(S): 50 INJECTION INTRAVENOUS at 00:55

## 2023-01-01 RX ADMIN — Medication 2: at 03:51

## 2023-01-01 RX ADMIN — PANTOPRAZOLE SODIUM 40 MILLIGRAM(S): 20 TABLET, DELAYED RELEASE ORAL at 17:03

## 2023-01-01 RX ADMIN — SODIUM CHLORIDE 1000 MILLILITER(S): 9 INJECTION, SOLUTION INTRAVENOUS at 00:45

## 2023-01-01 RX ADMIN — CEFTRIAXONE 100 MILLIGRAM(S): 500 INJECTION, POWDER, FOR SOLUTION INTRAMUSCULAR; INTRAVENOUS at 03:48

## 2023-01-01 RX ADMIN — Medication 650 MILLIGRAM(S): at 02:24

## 2023-01-01 RX ADMIN — SENNA PLUS 2 TABLET(S): 8.6 TABLET ORAL at 22:15

## 2023-01-01 RX ADMIN — LEVETIRACETAM 400 MILLIGRAM(S): 250 TABLET, FILM COATED ORAL at 06:16

## 2023-01-01 RX ADMIN — SODIUM CHLORIDE 1000 MILLILITER(S): 9 INJECTION INTRAMUSCULAR; INTRAVENOUS; SUBCUTANEOUS at 03:00

## 2023-01-01 RX ADMIN — CHLORHEXIDINE GLUCONATE 1 APPLICATION(S): 213 SOLUTION TOPICAL at 05:06

## 2023-01-01 RX ADMIN — PANTOPRAZOLE SODIUM 40 MILLIGRAM(S): 20 TABLET, DELAYED RELEASE ORAL at 07:35

## 2023-01-01 RX ADMIN — Medication 8.44 MICROGRAM(S)/KG/MIN: at 02:56

## 2023-01-01 RX ADMIN — NIMODIPINE 60 MILLIGRAM(S): 60 SOLUTION ORAL at 06:20

## 2023-01-01 RX ADMIN — CHLORHEXIDINE GLUCONATE 1 APPLICATION(S): 213 SOLUTION TOPICAL at 07:31

## 2023-01-01 RX ADMIN — CEFTRIAXONE 100 MILLIGRAM(S): 500 INJECTION, POWDER, FOR SOLUTION INTRAMUSCULAR; INTRAVENOUS at 02:56

## 2023-01-01 RX ADMIN — Medication 4: at 12:39

## 2023-01-01 RX ADMIN — FENTANYL CITRATE 75 MICROGRAM(S): 50 INJECTION INTRAVENOUS at 17:58

## 2023-01-01 RX ADMIN — LEVETIRACETAM 400 MILLIGRAM(S): 250 TABLET, FILM COATED ORAL at 06:20

## 2023-01-01 RX ADMIN — SENNA PLUS 2 TABLET(S): 8.6 TABLET ORAL at 06:17

## 2023-01-01 RX ADMIN — LEVETIRACETAM 400 MILLIGRAM(S): 250 TABLET, FILM COATED ORAL at 19:05

## 2023-01-01 RX ADMIN — Medication 2: at 17:05

## 2023-01-01 RX ADMIN — Medication 1 GRAM(S): at 06:17

## 2023-01-01 RX ADMIN — HEPARIN SODIUM 5000 UNIT(S): 5000 INJECTION INTRAVENOUS; SUBCUTANEOUS at 22:14

## 2023-01-01 RX ADMIN — NIMODIPINE 60 MILLIGRAM(S): 60 SOLUTION ORAL at 14:01

## 2023-01-01 RX ADMIN — SODIUM CHLORIDE 1000 MILLILITER(S): 9 INJECTION, SOLUTION INTRAVENOUS at 01:53

## 2023-01-01 RX ADMIN — LEVETIRACETAM 400 MILLIGRAM(S): 250 TABLET, FILM COATED ORAL at 17:03

## 2023-01-01 RX ADMIN — Medication 4: at 01:20

## 2023-01-01 RX ADMIN — HUMAN INSULIN 25 UNIT(S): 100 INJECTION, SUSPENSION SUBCUTANEOUS at 23:42

## 2023-01-01 RX ADMIN — Medication 50 MILLIEQUIVALENT(S): at 01:20

## 2023-01-01 RX ADMIN — ATORVASTATIN CALCIUM 80 MILLIGRAM(S): 80 TABLET, FILM COATED ORAL at 22:33

## 2023-01-01 RX ADMIN — Medication 62.5 MILLIMOLE(S): at 09:51

## 2023-01-01 RX ADMIN — NIMODIPINE 60 MILLIGRAM(S): 60 SOLUTION ORAL at 17:03

## 2023-01-01 RX ADMIN — POTASSIUM PHOSPHATE, MONOBASIC POTASSIUM PHOSPHATE, DIBASIC 83.33 MILLIMOLE(S): 236; 224 INJECTION, SOLUTION INTRAVENOUS at 17:05

## 2023-01-01 RX ADMIN — Medication 650 MILLIGRAM(S): at 02:58

## 2023-01-01 RX ADMIN — NIMODIPINE 60 MILLIGRAM(S): 60 SOLUTION ORAL at 09:13

## 2023-01-01 RX ADMIN — POLYETHYLENE GLYCOL 3350 17 GRAM(S): 17 POWDER, FOR SOLUTION ORAL at 17:06

## 2023-01-01 RX ADMIN — Medication 50 MILLIEQUIVALENT(S): at 12:50

## 2023-01-01 RX ADMIN — POTASSIUM PHOSPHATE, MONOBASIC POTASSIUM PHOSPHATE, DIBASIC 62.5 MILLIMOLE(S): 236; 224 INJECTION, SOLUTION INTRAVENOUS at 07:35

## 2023-01-01 RX ADMIN — Medication 50 MILLIEQUIVALENT(S): at 02:49

## 2023-01-01 RX ADMIN — PANTOPRAZOLE SODIUM 40 MILLIGRAM(S): 20 TABLET, DELAYED RELEASE ORAL at 17:05

## 2023-01-01 RX ADMIN — NIMODIPINE 60 MILLIGRAM(S): 60 SOLUTION ORAL at 22:14

## 2023-01-01 RX ADMIN — PROPOFOL 5.4 MICROGRAM(S)/KG/MIN: 10 INJECTION, EMULSION INTRAVENOUS at 22:29

## 2023-01-01 RX ADMIN — CEFTRIAXONE 100 MILLIGRAM(S): 500 INJECTION, POWDER, FOR SOLUTION INTRAMUSCULAR; INTRAVENOUS at 14:54

## 2023-01-01 RX ADMIN — PANTOPRAZOLE SODIUM 40 MILLIGRAM(S): 20 TABLET, DELAYED RELEASE ORAL at 23:01
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
## 2023-08-23 NOTE — BRIEF OPERATIVE NOTE - OPERATION/FINDINGS
Patient was brought to the angio suite, placed on x-ray table, placed on standard monitor by anesthesiologist. B/l groins were prepped and draped in usual sterile fashion.   6 Tamazight short sheath was used in the right common femoral artery for access. A diagnostic angiogram was performed under general anesthesia care. B/l ICAs, b/l ECAs and bilateral vertebral artery were injected and studied.    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   It was microcatheterized and coil embolized successfully.     Full report to follow  Patient tolerated the procedure well and at baseline neurological condition.   Right groin vascular access site was closed with vascade and applied manual compression.   There is no hematoma.   Patient was transferred to NSICU.     Above discussed with Dr. Agrawal.
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
## 2023-08-23 NOTE — PROGRESS NOTE ADULT - SUBJECTIVE AND OBJECTIVE BOX
***********************************************  ADULT NSICU PROGRESS NOTE  RAPHAEL CAMACHO [id number] [facility] [id number]  ***********************************************    Brief HPI: 52 yo F with history of HTN transferred from [facility] for management of HHV/MF4 L. PCOM [facility].  Per sign out that I received from attending ED Dr. Mccurdy, the patient was found unresponsive in bed with last well the night PTA.  Patient was intubated and had EVD placed at [facility] prior to transfer/accepted by Dr. Wooten.    ROS: negative except per mentioned above in 24h interval events.      VITALS:    ICU Vital Signs Last 24 Hrs  T(C): --  T(F): --  HR: 64 (23 Aug 2023 12:45) (64 - 74)  BP: 96/58 (23 Aug 2023 12:30) (96/58 - 96/58)  BP(mean): 73 (23 Aug 2023 12:30) (73 - 73)  ABP: --  ABP(mean): --  RR: 12 (23 Aug 2023 12:30) (12 - 12)  SpO2: 99% (23 Aug 2023 12:45) (99% - 100%)    O2 Parameters below as of 23 Aug 2023 12:30  Patient On (Oxygen Delivery Method): ventilator    O2 Concentration (%): 40        Mode: AC/ CMV (Assist Control/ Continuous Mandatory Ventilation)  RR (machine): 12  TV (machine): 420  FiO2: 40  PEEP: 5  ITime: 1  MAP: 11  PIP: 21      I&O's Summary      EXAM:     [place] Coma Scale: 1/1T/1    General: normocephalic, (+)EVD, laying in bed, unresponsive  Neuro     MS: [place] Coma Scale: 1/1T/1    CN: PERRL L 3mm > R 2mm, gaze is conjugate, face symmetric, hearing grossly intact, (+)corneals bilaterally, (+)cough    Mot: bulk normal, tone normal, no movement to noxious stimuli throughout  Chest: nonlabored respirations, no adventitious lung sounds bilaterally, heart regular rate/rhythm, present S1/S2, no murmurs or rubs  Abdomen: nondistended, soft and nontender without peritoneal signs, normoactive bowel sounds  Extremities: no clubbing, well-perfused, no edema    LABORATORY DATA:                            14.7   18.85 )-----------( 252      ( 23 Aug 2023 12:26 )             42.8                 IMAGING DATA:    CARDIOLOGY DATA:    MICROBIOLOGY DATA:        MEDICATIONS  (STANDING):  chlorhexidine 0.12% Liquid 15 milliLiter(s) Oral Mucosa every 12 hours  chlorhexidine 2% Cloths 1 Application(s) Topical <User Schedule>  dextrose 5%. 1000 milliLiter(s) (50 mL/Hr) IV Continuous <Continuous>  dextrose 5%. 1000 milliLiter(s) (100 mL/Hr) IV Continuous <Continuous>  dextrose 50% Injectable 12.5 Gram(s) IV Push once  dextrose 50% Injectable 25 Gram(s) IV Push once  dextrose 50% Injectable 25 Gram(s) IV Push once  glucagon  Injectable 1 milliGRAM(s) IntraMuscular once  insulin lispro (ADMELOG) corrective regimen sliding scale   SubCutaneous three times a day before meals  levETIRAcetam  IVPB 500 milliGRAM(s) IV Intermittent every 12 hours  sodium chloride 0.9%. 1000 milliLiter(s) (75 mL/Hr) IV Continuous <Continuous>    MEDICATIONS  (PRN):  dextrose Oral Gel 15 Gram(s) Oral once PRN Blood Glucose LESS THAN 70 milliGRAM(s)/deciliter      ***********************************************  ASSESSMENT AND PLAN  ***********************************************    #HHV/MF4 L. PCOM aSA  #Acute respiratory failure due   #HIstory of HTN    NEURO  - Admit NSICU, Q1h neuro checks / Q1h vital signs / Q1h pupillometry  - To cath lab for DSA/aneurysm securement  - /500, nimotop 60q4, baseline TCD  - No sedation at this time, will plan for RASS goal 0 to -1  - PT/OT if/when able    PULM  - AC/VC 12/410/40%/+5, pressure support as tolerated  - Baseline CXR, ABG when returns  - SpO2 goal > 92%, supplemental O2 and pulm toileting as needed    CARDIO  - BP goal: SBP < 140  - Baseline trop, TTE, EKG    GI  - Diet: NPO, will ne enteral access  - Stress ulcer prophylaxis: PPI while intubated  - Stool count, bowel regimen    /RENAL  - Monitor UOP/volume status, BUN/SCr  - Isotonic MIVF while NPO  - Euvolemia/eunatremia    HEME  - Maintain Hb > 7.0, PLT > 100,000 in setting of fresh bleed  - SCDs, holding VTE chemoppx    ID  - Monitor for infectious s/s, fever curve, leukocytosis    ENDO  - SGlu < 200  - RASSI    08-23-23 @ 13:20

## 2023-08-23 NOTE — PROCEDURAL SAFETY CHECKLIST WITH OR WITHOUT SEDATION - NSPOSTCOMMENTFT_GEN_ALL_CORE
pt remained stable throughout procedure.  1mg versed given as ordered.  Pending ct scan afterwards. Primary RN Caitlyn to continue with care.

## 2023-08-23 NOTE — PROGRESS NOTE ADULT - SUBJECTIVE AND OBJECTIVE BOX
HPI:  52 y/o F with PMH HTN was brought in by EMS to [facility] after being found unresponsive by her son this morning. Patient’s last known well was 7pm last night (8/22/2023). As per [facility] EM resident, the patient was obtunded and intubated upon arrival in the emergency department. Stroke code was called and CTH was ordered showing diffuse SAH with extensive IVH. 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 EVD was placed urgently at [facility], 1g of keppra given. Per EM resident, pt does not take blood thinners. VSS. [facility] ED Attending Dr. Mccurdy initiated transfer to [facility]. EMS initiated norepinephrine during transport to [facility]. HH5 MF4 NIHSS 31 (23 Aug 2023 13:11)    On admission, the patient was:  GCS:  Milligan-Stewart: 5  modified Rudd: 4  ICH score:  NIHSS:    *** HIGH RISK OF DVT PRESENT ON ADMISSION ***      ICU Vital Signs Last 24 Hrs  HR: 62 (23 Aug 2023 18:30) (56 - 74)  BP: 106/61 (23 Aug 2023 18:30) (95/70 - 108/63)  BP(mean): 78 (23 Aug 2023 18:30) (72 - 79)  ABP: 108/56 (23 Aug 2023 18:30) (107/57 - 132/79)  ABP(mean): 76 (23 Aug 2023 18:30) (76 - 101)  RR: 12 (23 Aug 2023 18:30) (12 - 12)  SpO2: 100% (23 Aug 2023 18:30) (99% - 100%)      08-23-23 @ 07:01  -  08-23-23 @ 19:28  --------------------------------------------------------  IN: 415 mL / OUT: 1100 mL / NET: -685 mL      Mode: AC/ CMV (Assist Control/ Continuous Mandatory Ventilation), RR (machine): 12, TV (machine): 410, FiO2: 40, PEEP: 5, ITime: 1, MAP: 9.3, PIP: 22      EXAM:   [place] Coma Scale: 1/1T/1  General: normocephalic, (+)EVD, laying in bed, unresponsive  Neuro     MS: Agustín Coma Scale: 1/1T/1    CN: PERRL L 3mm > R 2mm, gaze is conjugate, face symmetric, hearing grossly intact, (+)corneals bilaterally, (+)cough    Mot:  Chest: Clear   Heart: Regular rate/rhythm, S1/S2  Abdomen: nondistended, soft and nontender without peritoneal signs, normoactive bowel sounds  Extremities: No edema      MEDICATIONS:  atorvastatin 80 milliGRAM(s) Oral at bedtime  chlorhexidine 0.12% Liquid 15 milliLiter(s) Oral Mucosa every 12 hours  chlorhexidine 2% Cloths 1 Application(s) Topical <User Schedule>  insulin lispro (ADMELOG) corrective regimen sliding scale   SubCutaneous three times a day before meals  levETIRAcetam  IVPB 500 milliGRAM(s) IV Intermittent every 12 hours  niMODipine 60 milliGRAM(s) Oral every 4 hours  norepinephrine Infusion 0.05 MICROgram(s)/kG/Min (8.44 mL/Hr) IV Continuous <Continuous>  pantoprazole  Injectable 40 milliGRAM(s) IV Push daily  senna 2 Tablet(s) Oral at bedtime  sodium chloride 0.9%. 1000 milliLiter(s) (90 mL/Hr) IV Continuous <Continuous>      LABS:                         13.2   19.32 )-----------( 242      ( 23 Aug 2023 16:06 )             38.1     08-23    139  |  105  |  10  ----------------------------<  160<H>  4.4   |  22  |  0.44<L>    Ca    7.9<L>      23 Aug 2023 16:06  Phos  4.4     08-23  Mg     1.6     08-23    TPro  7.3  /  Alb  4.2  /  TBili  0.4  /  DBili  x   /  AST  63<H>  /  ALT  108<H>  /  AlkPhos  61  08-23    LIVER FUNCTIONS - ( 23 Aug 2023 12:26 )  Alb: 4.2 g/dL / Pro: 7.3 g/dL / ALK PHOS: 61 U/L / ALT: 108 U/L / AST: 63 U/L / GGT: x           ABG - ( 23 Aug 2023 16:09 )  pH, Arterial: 7.34  pH, Blood: x     /  pCO2: 39    /  pO2: 93    / HCO3: 21    / Base Excess: -4.4  /  SaO2: 98.8          ASSESSMENT:  HH5 /MF4 L. PCOM aSAH  Acute respiratory failure due   History of HTN    PLAN:  NEURO:  Q1h neuro checks/ Q1h pupillometry  S/P DSA/aneurysm treatment  Continue nimodipine 60mg Q4, baseline TCDs, euvolemia  IRRAflow EVD  No sedation at this time  PT/OT when appropriate    PULM:  Continue full vent support  Baseline CXR, ABG   VAP bundle    CARDIAC:  SBP goal: SBP < 140  Baseline trop elevated*  EKG  TTE    GI:  Diet: NPO, will need enteral access  Stress ulcer prophylaxis: PPI while intubated  Stool count, bowel regimen  LBM:    /RENAL:  Monitor UOP/volume status, BUN/Cr  Euvolemia/eunatremia  Mccormick    HEME:  Maintain Hb > 7.0, PLT > 100,000 in setting of fresh bleed  SCDs, holding VTE chemoppx due to acute bleed    ID: Leukocytosis  Monitor for infectious s/s, fever curve    ENDO:  Glucose goal 140- 180mg/DL  A1c  TSH  LDL    MISC:    SOCIAL/FAMILY:  [] awaiting [] updated at bedside [] family meeting    CODE STATUS:  [] Full Code [] DNR [] DNI [] Palliative/Comfort Care    DISPOSITION:  [] ICU [] Stroke Unit [] Floor [] EMU [] RCU [] PCU    Time spent: ___ [] critical care minutes         HPI:  52 y/o F with PMH HTN was brought in by EMS to [facility] after being found unresponsive by her son this morning. Patient’s last known well was 7pm last night (8/22/2023). As per [facility] EM resident, the patient was obtunded and intubated upon arrival in the emergency department. Stroke code was called and CTH was ordered showing diffuse SAH with extensive IVH. 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 EVD was placed urgently at [facility], 1g of Keppra given. Per EM resident, pt does not take blood thinners. VSS. [facility] ED Attending Dr. Mccurdy initiated transfer to [facility]. EMS initiated norepinephrine during transport to [facility]. HH5 MF4 NIHSS 31 (23 Aug 2023 13:11)    On admission, the patient was:  GCS: 3  Hunt-Stewart: 5  modified Rudd: 4  ICH score:  NIHSS:    *** HIGH RISK OF DVT PRESENT ON ADMISSION ***      ICU Vital Signs Last 24 Hrs  Tmax 101.5  HR: 62 (23 Aug 2023 18:30) (56 - 74)  BP: 106/61 (23 Aug 2023 18:30) (95/70 - 108/63)  BP(mean): 78 (23 Aug 2023 18:30) (72 - 79)  ABP: 108/56 (23 Aug 2023 18:30) (107/57 - 132/79)  ABP(mean): 76 (23 Aug 2023 18:30) (76 - 101)  RR: 12 (23 Aug 2023 18:30) (12 - 12)  SpO2: 100% (23 Aug 2023 18:30) (99% - 100%)      08-23-23 @ 07:01  -  08-23-23 @ 19:28  --------------------------------------------------------  IN: 415 mL / OUT: 1100 mL / NET: -685 mL      Mode: AC/ CMV (Assist Control/ Continuous Mandatory Ventilation), RR (machine): 12, TV (machine): 410, FiO2: 40, PEEP: 5, ITime: 1, MAP: 9.3, PIP: 22    EXAM:   Agustín Coma Scale: 3  General: normocephalic, (+) EVD, laying in bed, unresponsive  HEENT: OG tube with coffee ground appearing material, ETT  Neuro     MS: [place] Coma Scale: 1/1T/1    CN: PERRL L 3 mm sluggish, > R 2mm reactive, gaze is conjugate, face symmetric,, (+)corneals bilaterally, (+)cough, overbreathes vent    Mot: Uppers 0/5, RLE TF, LLE trace WD  Chest: Clear, no wheeze  Heart: Regular rate/rhythm, S1/S2  Abdomen: Soft, nontender, +BS  Extremities: No edema      MEDICATIONS:  atorvastatin 80 milliGRAM(s) Oral at bedtime  chlorhexidine 0.12% Liquid 15 milliLiter(s) Oral Mucosa every 12 hours  chlorhexidine 2% Cloths 1 Application(s) Topical <User Schedule>  insulin lispro (ADMELOG) corrective regimen sliding scale   SubCutaneous three times a day before meals  levETIRAcetam  IVPB 500 milliGRAM(s) IV Intermittent every 12 hours  niMODipine 60 milliGRAM(s) Oral every 4 hours  norepinephrine Infusion 0.05 MICROgram(s)/kG/Min (8.44 mL/Hr) IV Continuous <Continuous>  pantoprazole  Injectable 40 milliGRAM(s) IV Push daily  senna 2 Tablet(s) Oral at bedtime  sodium chloride 0.9%. 1000 milliLiter(s) (90 mL/Hr) IV Continuous <Continuous>      LABS:                         13.2   19.32 )-----------( 242      ( 23 Aug 2023 16:06 )             38.1     08-23    139  |  105  |  10  ----------------------------<  160<H>  4.4   |  22  |  0.44<L>    Ca    7.9<L>      23 Aug 2023 16:06  Phos  4.4     08-23  Mg     1.6     08-23    TPro  7.3  /  Alb  4.2  /  TBili  0.4  /  DBili  x   /  AST  63<H>  /  ALT  108<H>  /  AlkPhos  61  08-23    LIVER FUNCTIONS - ( 23 Aug 2023 12:26 )  Alb: 4.2 g/dL / Pro: 7.3 g/dL / ALK PHOS: 61 U/L / ALT: 108 U/L / AST: 63 U/L / GGT: x           ABG - ( 23 Aug 2023 16:09 )  pH, Arterial: 7.34  pH, Blood: x     /  pCO2: 39    /  pO2: 93    / HCO3: 21    / Base Excess: -4.4  /  SaO2: 98.8        PROCEDURE  There is a giant aneurysm of left Pcomm measuring 2.3cm x 1.8cm.   It was microcatheterized and coil embolized successfully.       ASSESSMENT:  HH5 /MF4 L. PCOMM aSAH BD1  Acute respiratory failure due above  History of HTN    PLAN:  NEURO:  Q1h neurochecks/ Q1h pupillometry  S/P DSA/aneurysm treatment  Continue nimodipine 60mg Q4, baseline TCDs, euvolemia  IRRAflow EVD at 5cmH2O  No sedation at this time  Bedrest for now. PT/OT when appropriate  LiveOn contact    PULM:  Continue full vent support  Baseline CXR, ABG   VAP bundle    CARDIAC:  -150  Baseline trop elevated. Trend trops. EKG  TTE pending  Bernarda  Continue statin    GI: Rule out GI bleed  Diet: NPO, OG tube  Coffee ground emesis; PPI while intubated  Gastric occult. Repeat CBC stat  Stool count, bowel regimen  LBM: Awaiting  GI consult    /RENAL: Significant autodiuresis; R/O DI  Check urine and serum Osm, sp gravity. Stat labs.  Replete lytes aggressively  Monitor UOP/volume status, BUN/Cr  Euvolemia/eunatremia  Keep Mccormick for now    HEME: SAH, GI bleed  Maintain Hb > 7.0, PLT > 100,000 in setting of fresh bleed  SCDs, holding VTE chemoppx due to acute bleed    ID: Leukocytosis, fevers  Panculture (blood, urine, sputum, procalcitonin, CXR, CSF)    ENDO:  Glucose goal 140- 180mg/DL  A1c: 6.2  TSH: 0.9  LDL: 164    MISC:    SOCIAL/FAMILY:  [x] awaiting [] updated at bedside [] family meeting    CODE STATUS:  [x] Full Code [] DNR [] DNI [] Palliative/Comfort Care    DISPOSITION:  [x] ICU [] Stroke Unit [] Floor [] EMU [] RCU [] PCU    Time spent: 45 critical care minutes

## 2023-08-23 NOTE — H&P ADULT - HISTORY OF PRESENT ILLNESS
53F with PMH HTN was BIBEMS to [facility] after being found unresponsive by her son this morning. Patient’s last known well was 7pm last night (8/24/2023). As per [facility] EM resident, the patient was obtunded and intubated upon arrival in the emergency department. A CTH was ordered and showed large volume SAH pooling in the ventricles. 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 EVD drain placed at [facility], 1g of keppra given. Per EM resident, pt does not take blood thinners. [facility] ED Attending Dr. Mccurdy initiated transfer to [facility]. EMS initiated norepinephrine during transport to [facility]. Upon arrival to [facility] NSICU pt is intubated, obtunded. 53F with PMH HTN was BIBEMS to [facility] after being found unresponsive by her son this morning. Patient’s last known well was 7pm last night (8/22/2023). As per [facility] EM resident, the patient was obtunded and intubated upon arrival in the emergency department. Stroke code was called and CTH was ordered showing diffuse SAH with extensive IVH. 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 EVD was placed urgently at [facility], 1g of keppra given. Per EM resident, pt does not take blood thinners. [facility] ED Attending Dr. Mccurdy initiated transfer to [facility]. EMS initiated norepinephrine during transport to [facility]. Upon arrival to [facility] NSICU pt is intubated, obtunded. 53F with PMH HTN was BIBEMS to [facility] after being found unresponsive by her son this morning. Patient’s last known well was 7pm last night (8/22/2023). As per [facility] EM resident, the patient was obtunded and intubated upon arrival in the emergency department. Stroke code was called and CTH was ordered showing diffuse SAH with extensive IVH. 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 EVD was placed urgently at [facility], 1g of keppra given. Per EM resident, pt does not take blood thinners. VSS. [facility] ED Attending Dr. Mccurdy initiated transfer to [facility]. EMS initiated norepinephrine during transport to [facility]. HH5 MF4 NIHSS 31

## 2023-08-23 NOTE — H&P ADULT - NSHPPHYSICALEXAM_GEN_ALL_CORE
Constitutional: Obtunded. GCS 3.  Respiratory: intubated,   Cardiovascular: Regular rate and rhythm  Gastrointestinal:  Soft, nontender, nondistended.  Vascular: Extremities warm, no ulcers, no discoloration of skin.   Neurological: left pupil 2.9mm and sluggish, right 2.0mm and sluggish, corneal reflex present bilaterally, gag reflex intact, 0/5 strength in all 4 extremities Constitutional: Patient laying supine in bed, obtunded. GCS 3.  Respiratory: intubated, diminished breath sounds throughout left lung  Cardiovascular: Regular, rate and rhythm   Gastrointestinal:  Soft, nontender, nondistended.  Vascular: Extremities warm, no ulcers, no discoloration of skin, DP 2+ b/l/PT 1+b/l  Neurological: Not opening eyes to voice or noxious stimuli, not FC  CNII-XII: left pupil 2.9mm and sluggish, right 2.0mm and sluggish (seen on pupilometer), corneal reflex present bilaterally, gag reflex intact  Motor: Not moving all 4 extremities to noxious stimuli Constitutional: Patient laying supine in bed, obtunded. GCS 3.  Respiratory: intubated, diminished breath sounds throughout left lung  Cardiovascular: Regular, rate and rhythm   Gastrointestinal:  Soft, nontender, nondistended.  Vascular: Extremities warm, no ulcers, no discoloration of skin, DP 2+ b/l/PT 1+b/l  Neurological: Not opening eyes to voice or noxious stimuli, not FC  CNII-XII: left pupil 2.9mm and sluggish, right 2.0mm and sluggish (seen on pupilometer), corneal reflex present bilaterally, gag reflex intact  Motor: Not moving all 4 extremities to noxious stimuli  : trace grimace to suprapubic palpation

## 2023-08-24 NOTE — CONSULT NOTE ADULT - ASSESSMENT
**INCOMPLETE NOTE**  53y F found unresponsive at home, found to have diffuse SAH / IVH w/ 2.7cm aneurysm, s/p coil/embolization & EVD. Course c/b fever 102 & persistent leukocytosis, with unclear etiology. Possibly 2/2 +H Flu vs HHSV6 seen on CSF PCR, vs neurogenic iso SAH/stroke. Started on empiric CTX 2g for H flu in CSF.     CSF studies not consistent w/ meningitis, hence repeat studies.     Recommendations:   - F/u repeat CSF PCR.   - If enough CSF fluid available, please repeat other studies including cultures (bacterial, fungal, AFB), and studies including glucose, protein, & cell counts.   - F/u BCx, SpCx   - Team 1 will continue to follow you. Case d/w primary team.

## 2023-08-24 NOTE — PROCEDURE NOTE - NSINDICATIONS_GEN_A_CORE
arterial puncture to obtain ABG's/critical patient/monitoring purposes
antibiotic therapy/venous access

## 2023-08-24 NOTE — CONSULT NOTE ADULT - CONVERSATION DETAILS
Discussed patient's medical condition and how she was prior to hospitalization. They explained that she just enjoyed the little things in life and was completely independent. They felt that after her  left a year ago (they are currently estranged), things got better at home for her. Tori endorsed that they are still coming to terms with everything that has happened to their mother as she was overall healthy prior to this. They are hopeful at this time that she will recover and just want to see how each day goes. Tori, however, did ask about her prognosis for a recovery back to her previous baseline and I explained that with such an extensive bleed and with the additional large MCA stroke on top of that, no matter how well she recovers, she will certainly have deficits even in the very best case scenario.

## 2023-08-24 NOTE — CONSULT NOTE ADULT - PROBLEM SELECTOR RECOMMENDATION 9
Large SAH with IVH 2/2 large aneurysm s/p coiling. EVD in place.  - Poor overall neurological prognosis. Neuro exam at this time very poor.  - GOC ongoing with family

## 2023-08-24 NOTE — PROGRESS NOTE ADULT - SUBJECTIVE AND OBJECTIVE BOX
***********************************************  ADULT NSICU PROGRESS NOTE  RAPHAEL CAMACHO [id number] [facility] [id number]  ***********************************************    INTERVAL:  - IRRAS device placed yesterday, ICP spikes [id number], drain dropped to 0, settings to treat above 0, given mannitol w/ ICP improvement  - CTH shows L. MCA territory delineated stroke  - SNa elevated to 164, given DDAVP, started on MIVF  - Developed hematuria (now appears grossly resolved)  - Developed coffee ground output via enteral tube, started PPI BID  - Febrile 38.8, pancultured, CSF shows H. influenzae/HHV6 (+) CSF PCR results    ROS: negative except per mentioned above in 24h interval events.      EXAM:     [place] Coma Scale: 1/1T/1    General: normocephalic, (+)EVD, laying in bed, unresponsive  Neuro     MS: [place] Coma Scale: 1/1T/1    CN: PERRL L 3mm > R 2mm, gaze is conjugate, face symmetric, hearing grossly intact, (+)corneals bilaterally, (+)cough    Mot: bulk normal, tone normal, no movement to noxious stimuli throughout  Chest: nonlabored respirations, no adventitious lung sounds bilaterally, heart regular rate/rhythm, present S1/S2, no murmurs or rubs  Abdomen: nondistended, soft and nontender without peritoneal signs, normoactive bowel sounds  Extremities: no clubbing, well-perfused, no edema    Vital Signs Last 24 Hrs  T(C): 36.8 (24 Aug 2023 09:12), Max: 38.8 (24 Aug 2023 05:40)  T(F): 98.3 (24 Aug 2023 09:12), Max: 101.8 (24 Aug 2023 05:40)  HR: 72 (24 Aug 2023 11:00) (56 - 116)  BP: 113/69 (24 Aug 2023 11:00) (95/70 - 131/62)  BP(mean): 85 (24 Aug 2023 11:00) (72 - 92)  RR: 17 (24 Aug 2023 11:00) (12 - 18)  SpO2: 100% (24 Aug 2023 11:00) (95% - 100%)    Parameters below as of 24 Aug 2023 11:00  Patient On (Oxygen Delivery Method): ventilator    O2 Concentration (%): 40  I&O's Detail    23 Aug 2023 07:01  -  24 Aug 2023 07:00  --------------------------------------------------------  IN:    IV PiggyBack: 450 mL    Lactated Ringers: 700 mL    Lactated Ringers Bolus: 1000 mL    Norepinephrine: 125.6 mL    sodium chloride 0.225%: 1000 mL    sodium chloride 0.45%: 1000 mL    sodium chloride 0.9%: 720 mL    Sodium Chloride 0.9% Bolus: 1000 mL  Total IN: 5995.6 mL    OUT:    Emesis (mL): 800 mL    External Ventricular Device (mL): 111 mL    Voided (mL): 8230 mL  Total OUT: 9141 mL    Total NET: -3145.4 mL      24 Aug 2023 07:01  -  24 Aug 2023 12:11  --------------------------------------------------------  IN:    dextrose 5%: 300 mL    dextrose 5%: 500 mL    Lactated Ringers: 100 mL  Total IN: 900 mL    OUT:    External Ventricular Device (mL): 37 mL    Indwelling Catheter - Urethral (mL): 1300 mL    Norepinephrine: 0 mL  Total OUT: 1337 mL    Total NET: -437 mL        ABG - ( 24 Aug 2023 01:21 )  pH, Arterial: 7.46  pH, Blood: x     /  pCO2: 39    /  pO2: 89    / HCO3: 28    / Base Excess: 3.7   /  SaO2: 98.8                                    14.3   14.00 )-----------( 172      ( 24 Aug 2023 08:43 )             42.2     08-24    165<HH>  |  130<H>  |  11  ----------------------------<  171<H>  3.3<L>   |  25  |  0.63    Ca    9.1      24 Aug 2023 08:38  Phos  1.2     08-24  Mg     2.4     08-24    TPro  7.4  /  Alb  4.2  /  TBili  0.6  /  DBili  x   /  AST  63<H>  /  ALT  93<H>  /  AlkPhos  57  08-24      Urinalysis with Rflx Culture (collected 08-24-23 @ 01:26)    Culture - Sputum (collected 08-24-23 @ 00:09)  Source: ET Tube ET Tube  Gram Stain (08-24-23 @ 04:37):    Moderate WBC's    Rare Gram positive cocci in pairs    Culture - CSF with Gram Stain (collected 08-23-23 @ 22:22)  Source: .CSF CSF  Gram Stain (08-24-23 @ 03:34):    Few WBC's    No organisms seen      MEDICATIONS  (STANDING):  atorvastatin 80 milliGRAM(s) Oral at bedtime  cefTRIAXone   IVPB 2000 milliGRAM(s) IV Intermittent every 12 hours  chlorhexidine 0.12% Liquid 15 milliLiter(s) Oral Mucosa every 12 hours  chlorhexidine 2% Cloths 1 Application(s) Topical <User Schedule>  dextrose 5%. 500 milliLiter(s) (500 mL/Hr) IV Continuous <Continuous>  dextrose 5%. 1000 milliLiter(s) (125 mL/Hr) IV Continuous <Continuous>  insulin lispro (ADMELOG) corrective regimen sliding scale   SubCutaneous every 6 hours  levETIRAcetam  IVPB 500 milliGRAM(s) IV Intermittent every 12 hours  niMODipine 60 milliGRAM(s) Oral every 4 hours  norepinephrine Infusion 0.05 MICROgram(s)/kG/Min (8.44 mL/Hr) IV Continuous <Continuous>  pantoprazole  Injectable 40 milliGRAM(s) IV Push two times a day  potassium phosphate IVPB 30 milliMole(s) IV Intermittent once  senna 2 Tablet(s) Oral at bedtime    MEDICATIONS  (PRN):      ***********************************************  ASSESSMENT AND PLAN  ***********************************************    #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 MCA territory stroke  #Intracranial hypertension due to SAH, stroke  #Acute respiratory failure due to catastrophic brain injury  #R. 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     NEURO  - Admit NSICU, Q1h neuro checks / Q1h vital signs / Q1h pupillometry / groin checks  - IRRAS, treat above 0, drain at 0, monitor output, color  - CTH in AM  - /500, nimotop 60q4, baseline TCD  - No sedation at this time as patient is comatose, will plan for RASS goal 0 to -1  - PT/OT if/when able    PULM  - AC/VC 18/410/40%/+5, pressure support as tolerated  - CXR in AM given suspicion for stress cardiomyopathy/ischemic cardiomyopathy AECHF, trend BNP  - SpO2 goal > 92%, supplemental O2 and pulm toileting as needed    CARDIO  - BP goal: SBP < 160  - Trend cardiac troponins, TTE  - Cardiology consultation    GI  - Diet: currently NPO  - KUB, discontinue LIWS if no ileus  - Stress ulcer prophylaxis: PPI BID given suspicion for GIB  - Stool count, bowel regimen    /RENAL  - Monitor UOP/volume status, BUN/SCr  - D5W at 125/h, will consider Q1H match  - DDAVP 0.5mcg as needed for UOP > 300cc/h x3d  - Mccormick catheter  - Euvolemia/eunatremia    HEME  - Maintain Hb > 7.0, PLT > 100,000 in setting of fresh bleed  - SCDs, holding VTE chemoppx    ID  - Start CTX CNS coverage, recheck CSF profile/PCR, ID consultation pending results  - Monitor for infectious s/s, fever curve, leukocytosis    ENDO  - SGlu < 200  - ALBINA    08-23-23 @ 13:20

## 2023-08-24 NOTE — CONSULT NOTE ADULT - PROBLEM SELECTOR RECOMMENDATION 2
Complication following SAH and aneurysm. Over MCA area. Concern for edema given large area.  - Per discussion with family, they want all aggressive interventions for now  - Care per NSICU and Neurosurgery

## 2023-08-24 NOTE — CONSULT NOTE ADULT - TIME BILLING
As above
Excludes time spent on ACP discussions.  Emotional Support/Supportive Care and Clarification of Potential Disease Trajectory related to SAH and ischemic stroke.

## 2023-08-24 NOTE — CONSULT NOTE ADULT - ASSESSMENT
54 yo F with HTN transferred from [facility] after being found to have a diffuse SAH with extensive IVH. 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 EVD was placed urgently at [facility], Transferred to [facility] for further intervention. Aneursym coiled and patient admitted to NSICU. Palliative consulted for support and GOC.

## 2023-08-24 NOTE — CONSULT NOTE ADULT - PROBLEM SELECTOR RECOMMENDATION 4
GOC ongoing. Patient is legally , however,  has been estranged with no communication with family for past year. Primary team unable to contact him on admission. Given estrangement, per the [facility] Family Healthcare Decision Act, patient's spouse is considered to be legally  from patient and the next in line for surrogacy are her decision makers. In this case, her surrogates are her 3 adult children.  - Surrogates: 3 adult children.    In addition to the E/M visit, an advance care planning meeting was performed. Start time: 3:00pm; End time: 3:16pm; Total time: 16min. A face to face meeting to discuss advance care planning was held today regarding: RAPHAEL CAMACHO. Primary decision maker: Patient is unable to participate in decision making; Alternate/surrogate: Willie Camacho. Discussed advance directives including, but not limited to, healthcare proxy and code status. Documentation completed today: GOC note

## 2023-08-24 NOTE — CONSULT NOTE ADULT - SUBJECTIVE AND OBJECTIVE BOX
Surgery Consult Note    HPI:  53F with PMH HTN was BIBEMS to [facility] after being found unresponsive by her son this morning. Patient’s last known well was 7pm last night (8/22/2023). As per [facility] EM resident, the patient was obtunded and intubated upon arrival in the emergency department. Stroke code was called and CTH was ordered showing diffuse SAH with extensive IVH. 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 EVD was placed urgently at [facility], 1g of keppra given. Per EM resident, pt does not take blood thinners. VSS. [facility] ED Attending Dr. Mccurdy initiated transfer to [facility]. EMS initiated norepinephrine during transport to [facility]. 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 Vascular surgery consulted for IVC filter placement for acute DVT of the LEFT popliteal and peroneal veins.         PAST MEDICAL & SURGICAL HISTORY:  Hypertension    PSH: c section, tubal ligation           MEDICATIONS  (STANDING):  atorvastatin 80 milliGRAM(s) Oral at bedtime  cefTRIAXone   IVPB 2000 milliGRAM(s) IV Intermittent every 12 hours  chlorhexidine 0.12% Liquid 15 milliLiter(s) Oral Mucosa every 12 hours  chlorhexidine 2% Cloths 1 Application(s) Topical <User Schedule>  dextrose 5%. 500 milliLiter(s) (500 mL/Hr) IV Continuous <Continuous>  dextrose 5%. 1000 milliLiter(s) (125 mL/Hr) IV Continuous <Continuous>  heparin   Injectable 5000 Unit(s) SubCutaneous every 8 hours  insulin lispro (ADMELOG) corrective regimen sliding scale   SubCutaneous every 6 hours  levETIRAcetam  IVPB 500 milliGRAM(s) IV Intermittent every 12 hours  niMODipine 60 milliGRAM(s) Oral every 4 hours  norepinephrine Infusion 0.05 MICROgram(s)/kG/Min (8.44 mL/Hr) IV Continuous <Continuous>  pantoprazole  Injectable 40 milliGRAM(s) IV Push two times a day  senna 2 Tablet(s) Oral at bedtime    MEDICATIONS  (PRN):  sodium chloride 0.9% lock flush 10 milliLiter(s) IV Push every 1 hour PRN Pre/post blood products, medications, blood draw, and to maintain line patency      Allergies    No Known Allergies    Intolerances        SOCIAL HISTORY:    FAMILY HISTORY:      Vital Signs Last 24 Hrs  T(C): 37.1 (24 Aug 2023 14:10), Max: 38.8 (24 Aug 2023 05:40)  T(F): 98.8 (24 Aug 2023 14:10), Max: 101.8 (24 Aug 2023 05:40)  HR: 75 (24 Aug 2023 15:00) (56 - 116)  BP: 126/78 (24 Aug 2023 12:00) (95/70 - 131/62)  BP(mean): 97 (24 Aug 2023 12:00) (72 - 97)  RR: 18 (24 Aug 2023 15:00) (12 - 18)  SpO2: 99% (24 Aug 2023 15:00) (95% - 100%)    Parameters below as of 24 Aug 2023 15:00  Patient On (Oxygen Delivery Method): ventilator    O2 Concentration (%): 40    I&O's Summary    23 Aug 2023 07:01  -  24 Aug 2023 07:00  --------------------------------------------------------  IN: 5995.6 mL / OUT: 9141 mL / NET: -3145.4 mL    24 Aug 2023 07:01  -  24 Aug 2023 16:10  --------------------------------------------------------  IN: 1400 mL / OUT: 2007 mL / NET: -607 mL        Physical Exam:  General: sedated  ENT: NG tube in place. Murky output  Pulmonary: On mechanical ventilation  Cardiovascular: sinus rythm on NE   Abdominal: obese, soft abdomen.  Vascular:  Femoral: right +2 and left +2  Popliteal: right +1 and left +1. PT: non palpable +2. DP: right +2  left +2. Warm extremities, no skin changes.  Doppler Signals:  PT biphasic bilateral    Extremities: extremities with soft compartments, left leg similar to right leg.   Neurological: Ventricular day in place. Does not open eyes to voice or noxious stimuli, pupils sluggish bilat. +Corneal reflex bilat, +gag reflex intact.          Lines/drains/tubes:    LABS:                        14.3   14.00 )-----------( 172      ( 24 Aug 2023 08:43 )             42.2     08-24    165<HH>  |  130<H>  |  11  ----------------------------<  171<H>  3.3<L>   |  25  |  0.63    Ca    9.1      24 Aug 2023 08:38  Phos  1.2     08-24  Mg     2.4     08-24    TPro  7.4  /  Alb  4.2  /  TBili  0.6  /  DBili  x   /  AST  63<H>  /  ALT  93<H>  /  AlkPhos  57  08-24    PT/INR - ( 23 Aug 2023 16:06 )   PT: 11.9 sec;   INR: 1.05          PTT - ( 23 Aug 2023 16:06 )  PTT:34.0 sec  Urinalysis Basic - ( 24 Aug 2023 08:38 )    Color: x / Appearance: x / SG: x / pH: x  Gluc: 171 mg/dL / Ketone: x  / Bili: x / Urobili: x   Blood: x / Protein: x / Nitrite: x   Leuk Esterase: x / RBC: x / WBC x   Sq Epi: x / Non Sq Epi: x / Bacteria: x      CAPILLARY BLOOD GLUCOSE      POCT Blood Glucose.: 173 mg/dL (24 Aug 2023 11:13)  POCT Blood Glucose.: 153 mg/dL (24 Aug 2023 08:31)  POCT Blood Glucose.: 159 mg/dL (23 Aug 2023 19:14)    LIVER FUNCTIONS - ( 24 Aug 2023 01:19 )  Alb: 4.2 g/dL / Pro: 7.4 g/dL / ALK PHOS: 57 U/L / ALT: 93 U/L / AST: 63 U/L / GGT: x             Cultures:  Culture Results:   No growth at 12 hours (08-24 @ 02:42)  Culture Results:   No growth at 12 hours (08-24 @ 02:42)

## 2023-08-24 NOTE — CONSULT NOTE ADULT - REASON FOR ADMISSION
AMS, SAH, cerebral aneurysm

## 2023-08-24 NOTE — PROCEDURE NOTE - NSPROCDETAILS_GEN_ALL_CORE
location identified, draped/prepped, sterile technique used/sterile dressing applied/sterile technique, catheter placed/supine position/ultrasound guidance
location identified, draped/prepped, sterile technique used, needle inserted/introduced/positive blood return obtained via catheter/connected to a pressurized flush line/sutured in place/hemostasis with direct pressure, dressing applied/all materials/supplies accounted for at end of procedure

## 2023-08-24 NOTE — CONSULT NOTE ADULT - PROBLEM SELECTOR RECOMMENDATION 5
Following for GOC. GOC ongoing. Will continue to follow.    Lyubov Weinstein MD  Palliative Care Attending  Geriatrics and Palliative Consult Service

## 2023-08-24 NOTE — CONSULT NOTE ADULT - SUBJECTIVE AND OBJECTIVE BOX
**INCOMPLETE NOTE**    INFECTIOUS DISEASES INITIAL CONSULT NOTE    HPI:    53F with PMH HTN was BIBEMS to [facility] after being found unresponsive by her son this morning. CTH 8/24 with diffuse SAH with extensive IVH. 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 EVD was placed urgently at [facility], Transferred to [facility] for further intervention, now s/p angio coil embolization of giant left pcomm aneurysm (8/23) and EVD exchage for IRRAflow.     Course c/b fever 102 on POD 1 (8/24), pancultured at that time. CSF shows +H.  influenzae & +HHV6 on CSF PCR. Pt noted to have persistent (however downtrending) leukocytosis from WBC 18.85K on 8/23 --> 14K on 8/24. Also w/ fever to 101.8 Tmax ~5:40AM on 8/24. Otherwise hemodynamically stable.       PAST MEDICAL & SURGICAL HISTORY:  Hypertension      Review of Systems:   Unable to assess ROS given mentation.      ANTIBIOTICS:  MEDICATIONS  (STANDING):  atorvastatin 80 milliGRAM(s) Oral at bedtime  cefTRIAXone   IVPB 2000 milliGRAM(s) IV Intermittent every 12 hours  chlorhexidine 0.12% Liquid 15 milliLiter(s) Oral Mucosa every 12 hours  chlorhexidine 2% Cloths 1 Application(s) Topical <User Schedule>  dextrose 5%. 500 milliLiter(s) (500 mL/Hr) IV Continuous <Continuous>  dextrose 5%. 1000 milliLiter(s) (125 mL/Hr) IV Continuous <Continuous>  insulin lispro (ADMELOG) corrective regimen sliding scale   SubCutaneous every 6 hours  levETIRAcetam  IVPB 500 milliGRAM(s) IV Intermittent every 12 hours  niMODipine 60 milliGRAM(s) Oral every 4 hours  norepinephrine Infusion 0.05 MICROgram(s)/kG/Min (8.44 mL/Hr) IV Continuous <Continuous>  pantoprazole  Injectable 40 milliGRAM(s) IV Push two times a day  senna 2 Tablet(s) Oral at bedtime    MEDICATIONS  (PRN):      Allergies    No Known Allergies    Intolerances        SOCIAL HISTORY:    FAMILY HISTORY:   no FH leading to current infection    Vital Signs Last 24 Hrs  T(C): 36.8 (24 Aug 2023 09:12), Max: 38.8 (24 Aug 2023 05:40)  T(F): 98.3 (24 Aug 2023 09:12), Max: 101.8 (24 Aug 2023 05:40)  HR: 85 (24 Aug 2023 13:00) (56 - 116)  BP: 126/78 (24 Aug 2023 12:00) (95/70 - 131/62)  BP(mean): 97 (24 Aug 2023 12:00) (72 - 97)  RR: 18 (24 Aug 2023 13:00) (12 - 18)  SpO2: 100% (24 Aug 2023 13:00) (95% - 100%)    Parameters below as of 24 Aug 2023 13:00  Patient On (Oxygen Delivery Method): ventilator    O2 Concentration (%): 40    08-23-23 @ 07:01  -  08-24-23 @ 07:00  --------------------------------------------------------  IN: 5995.6 mL / OUT: 9141 mL / NET: -3145.4 mL    08-24-23 @ 07:01  -  08-24-23 @ 13:43  --------------------------------------------------------  IN: 1150 mL / OUT: 1646 mL / NET: -496 mL        PHYSICAL EXAM:  Constitutional: Intubated, Obtunded, lying in bed  Pulmonary: Intubated, decreased breath sounds L>R  Heart: heart rate was normal and rhythm regular, normal S1 and S2  Vascular:. there was no peripheral edema  Abdomen: normal bowel sounds, soft, non-tender  Neurological: Does not open eyes to voice or noxious stimuli, pupils sluggish bilat. +Corneal reflex bilat, +gag reflex intact.          LABS:                        14.3   14.00 )-----------( 172      ( 24 Aug 2023 08:43 )             42.2     08-24    165<HH>  |  130<H>  |  11  ----------------------------<  171<H>  3.3<L>   |  25  |  0.63    Ca    9.1      24 Aug 2023 08:38  Phos  1.2     08-24  Mg     2.4     08-24    TPro  7.4  /  Alb  4.2  /  TBili  0.6  /  DBili  x   /  AST  63<H>  /  ALT  93<H>  /  AlkPhos  57  08-24    PT/INR - ( 23 Aug 2023 16:06 )   PT: 11.9 sec;   INR: 1.05          PTT - ( 23 Aug 2023 16:06 )  PTT:34.0 sec  Urinalysis Basic - ( 24 Aug 2023 08:38 )    Color: x / Appearance: x / SG: x / pH: x  Gluc: 171 mg/dL / Ketone: x  / Bili: x / Urobili: x   Blood: x / Protein: x / Nitrite: x   Leuk Esterase: x / RBC: x / WBC x   Sq Epi: x / Non Sq Epi: x / Bacteria: x        MICROBIOLOGY:    RADIOLOGY & ADDITIONAL STUDIES:   **INCOMPLETE NOTE**    INFECTIOUS DISEASES INITIAL CONSULT NOTE    HPI:    53F with PMH HTN was BIBEMS to [facility] after being found unresponsive by her son this morning. CTH 8/24 with diffuse SAH with extensive IVH. 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 EVD was placed urgently at [facility], Transferred to [facility] for further intervention, now s/p angio coil embolization of giant left pcomm aneurysm (8/23) and EVD exchage for IRRAflow.     Course c/b fever 102 on POD 1 (8/24), pancultured at that time. CSF shows +H.  influenzae & +HHV6 on CSF PCR. Pt noted to have persistent (however downtrending) leukocytosis from WBC 18.85K on 8/23 --> 14K on 8/24. Also w/ fever to 101.8 Tmax ~5:40AM on 8/24. Otherwise hemodynamically stable.     ID consulted for CSF findings iso leukocytosis and fever.     PAST MEDICAL & SURGICAL HISTORY:  Hypertension      Review of Systems:   Unable to assess ROS given mentation.      ANTIBIOTICS:  MEDICATIONS  (STANDING):  atorvastatin 80 milliGRAM(s) Oral at bedtime  cefTRIAXone   IVPB 2000 milliGRAM(s) IV Intermittent every 12 hours  chlorhexidine 0.12% Liquid 15 milliLiter(s) Oral Mucosa every 12 hours  chlorhexidine 2% Cloths 1 Application(s) Topical <User Schedule>  dextrose 5%. 500 milliLiter(s) (500 mL/Hr) IV Continuous <Continuous>  dextrose 5%. 1000 milliLiter(s) (125 mL/Hr) IV Continuous <Continuous>  insulin lispro (ADMELOG) corrective regimen sliding scale   SubCutaneous every 6 hours  levETIRAcetam  IVPB 500 milliGRAM(s) IV Intermittent every 12 hours  niMODipine 60 milliGRAM(s) Oral every 4 hours  norepinephrine Infusion 0.05 MICROgram(s)/kG/Min (8.44 mL/Hr) IV Continuous <Continuous>  pantoprazole  Injectable 40 milliGRAM(s) IV Push two times a day  senna 2 Tablet(s) Oral at bedtime    MEDICATIONS  (PRN):      Allergies    No Known Allergies    Intolerances        SOCIAL HISTORY:    FAMILY HISTORY:   no FH leading to current infection    Vital Signs Last 24 Hrs  T(C): 36.8 (24 Aug 2023 09:12), Max: 38.8 (24 Aug 2023 05:40)  T(F): 98.3 (24 Aug 2023 09:12), Max: 101.8 (24 Aug 2023 05:40)  HR: 85 (24 Aug 2023 13:00) (56 - 116)  BP: 126/78 (24 Aug 2023 12:00) (95/70 - 131/62)  BP(mean): 97 (24 Aug 2023 12:00) (72 - 97)  RR: 18 (24 Aug 2023 13:00) (12 - 18)  SpO2: 100% (24 Aug 2023 13:00) (95% - 100%)    Parameters below as of 24 Aug 2023 13:00  Patient On (Oxygen Delivery Method): ventilator    O2 Concentration (%): 40    08-23-23 @ 07:01  -  08-24-23 @ 07:00  --------------------------------------------------------  IN: 5995.6 mL / OUT: 9141 mL / NET: -3145.4 mL    08-24-23 @ 07:01  -  08-24-23 @ 13:43  --------------------------------------------------------  IN: 1150 mL / OUT: 1646 mL / NET: -496 mL        PHYSICAL EXAM:  Constitutional: Intubated, Obtunded, lying in bed  Pulmonary: Intubated, decreased breath sounds L>R  Heart: heart rate was normal and rhythm regular, normal S1 and S2  Vascular:. there was no peripheral edema  Abdomen: normal bowel sounds, soft, non-tender  Neurological: Does not open eyes to voice or noxious stimuli, pupils sluggish bilat. +Corneal reflex bilat, +gag reflex intact.          LABS:                        14.3   14.00 )-----------( 172      ( 24 Aug 2023 08:43 )             42.2     08-24    165<HH>  |  130<H>  |  11  ----------------------------<  171<H>  3.3<L>   |  25  |  0.63    Ca    9.1      24 Aug 2023 08:38  Phos  1.2     08-24  Mg     2.4     08-24    TPro  7.4  /  Alb  4.2  /  TBili  0.6  /  DBili  x   /  AST  63<H>  /  ALT  93<H>  /  AlkPhos  57  08-24    PT/INR - ( 23 Aug 2023 16:06 )   PT: 11.9 sec;   INR: 1.05          PTT - ( 23 Aug 2023 16:06 )  PTT:34.0 sec  Urinalysis Basic - ( 24 Aug 2023 08:38 )    Color: x / Appearance: x / SG: x / pH: x  Gluc: 171 mg/dL / Ketone: x  / Bili: x / Urobili: x   Blood: x / Protein: x / Nitrite: x   Leuk Esterase: x / RBC: x / WBC x   Sq Epi: x / Non Sq Epi: x / Bacteria: x        MICROBIOLOGY:    RADIOLOGY & ADDITIONAL STUDIES:   INFECTIOUS DISEASES INITIAL CONSULT NOTE    HPI:    53F with PMH HTN was BIBEMS to [facility] after being found unresponsive by her son this morning. CTH 8/24 with diffuse SAH with extensive IVH. 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 EVD was placed urgently at [facility], Transferred to [facility] for further intervention, now s/p angio coil embolization of giant left pcomm aneurysm (8/23) and EVD exchage for IRRAflow.     Course c/b fever 102 on POD 1 (8/24), pancultured at that time. CSF shows +H.  influenzae & +HHV6 on CSF PCR. Pt noted to have persistent (however downtrending) leukocytosis from WBC 18.85K on 8/23 --> 14K on 8/24. Also w/ fever to 101.8 Tmax ~5:40AM on 8/24. Otherwise hemodynamically stable.     ID consulted for CSF findings iso leukocytosis and fever.     PAST MEDICAL & SURGICAL HISTORY:  Hypertension      Review of Systems:   Unable to assess ROS given mentation.      ANTIBIOTICS:  MEDICATIONS  (STANDING):  atorvastatin 80 milliGRAM(s) Oral at bedtime  cefTRIAXone   IVPB 2000 milliGRAM(s) IV Intermittent every 12 hours  chlorhexidine 0.12% Liquid 15 milliLiter(s) Oral Mucosa every 12 hours  chlorhexidine 2% Cloths 1 Application(s) Topical <User Schedule>  dextrose 5%. 500 milliLiter(s) (500 mL/Hr) IV Continuous <Continuous>  dextrose 5%. 1000 milliLiter(s) (125 mL/Hr) IV Continuous <Continuous>  insulin lispro (ADMELOG) corrective regimen sliding scale   SubCutaneous every 6 hours  levETIRAcetam  IVPB 500 milliGRAM(s) IV Intermittent every 12 hours  niMODipine 60 milliGRAM(s) Oral every 4 hours  norepinephrine Infusion 0.05 MICROgram(s)/kG/Min (8.44 mL/Hr) IV Continuous <Continuous>  pantoprazole  Injectable 40 milliGRAM(s) IV Push two times a day  senna 2 Tablet(s) Oral at bedtime    MEDICATIONS  (PRN):      Allergies    No Known Allergies    Intolerances        SOCIAL HISTORY:    FAMILY HISTORY:   no FH leading to current infection    Vital Signs Last 24 Hrs  T(C): 36.8 (24 Aug 2023 09:12), Max: 38.8 (24 Aug 2023 05:40)  T(F): 98.3 (24 Aug 2023 09:12), Max: 101.8 (24 Aug 2023 05:40)  HR: 85 (24 Aug 2023 13:00) (56 - 116)  BP: 126/78 (24 Aug 2023 12:00) (95/70 - 131/62)  BP(mean): 97 (24 Aug 2023 12:00) (72 - 97)  RR: 18 (24 Aug 2023 13:00) (12 - 18)  SpO2: 100% (24 Aug 2023 13:00) (95% - 100%)    Parameters below as of 24 Aug 2023 13:00  Patient On (Oxygen Delivery Method): ventilator    O2 Concentration (%): 40    08-23-23 @ 07:01  -  08-24-23 @ 07:00  --------------------------------------------------------  IN: 5995.6 mL / OUT: 9141 mL / NET: -3145.4 mL    08-24-23 @ 07:01  -  08-24-23 @ 13:43  --------------------------------------------------------  IN: 1150 mL / OUT: 1646 mL / NET: -496 mL        PHYSICAL EXAM:  Constitutional: Intubated, Obtunded, lying in bed  Pulmonary: Intubated, decreased breath sounds L>R  Heart: heart rate was normal and rhythm regular, normal S1 and S2  Vascular:. there was no peripheral edema  Abdomen: normal bowel sounds, soft, non-tender  Neurological: Does not open eyes to voice or noxious stimuli, pupils sluggish bilat. +Corneal reflex bilat, +gag reflex intact.          LABS:                        14.3   14.00 )-----------( 172      ( 24 Aug 2023 08:43 )             42.2     08-24    165<HH>  |  130<H>  |  11  ----------------------------<  171<H>  3.3<L>   |  25  |  0.63    Ca    9.1      24 Aug 2023 08:38  Phos  1.2     08-24  Mg     2.4     08-24    TPro  7.4  /  Alb  4.2  /  TBili  0.6  /  DBili  x   /  AST  63<H>  /  ALT  93<H>  /  AlkPhos  57  08-24    PT/INR - ( 23 Aug 2023 16:06 )   PT: 11.9 sec;   INR: 1.05          PTT - ( 23 Aug 2023 16:06 )  PTT:34.0 sec  Urinalysis Basic - ( 24 Aug 2023 08:38 )    Color: x / Appearance: x / SG: x / pH: x  Gluc: 171 mg/dL / Ketone: x  / Bili: x / Urobili: x   Blood: x / Protein: x / Nitrite: x   Leuk Esterase: x / RBC: x / WBC x   Sq Epi: x / Non Sq Epi: x / Bacteria: x        MICROBIOLOGY:    RADIOLOGY & ADDITIONAL STUDIES:

## 2023-08-24 NOTE — CONSULT NOTE ADULT - ASSESSMENT
53 years old female S/P coil embolization a giant aneurysm of left posterior communicating artery measuring 2.3cm x 1.8cm on 8/23/23 on POD 1, sedated, on mechanical ventilation and NE. Vascular surgery consulted for IVC filter placement for acute DVT of the LEFT popliteal and peroneal veins. Patient is a candidate for IVC filter given current condition and since primary team planing to hold anticoagulation for at least a month, however patient has poor prognosis and is too unstable at the time for IVC filter placement.    Recommendations   - Patient is a candidate for IVC filter placement, but currently not stable for surgery, vascular team on board for IVC filter placement once patient medically stable and cleared for procedure  - Patient with poor prognosis, pending goals of care discussion with family.   - Call back vascular surgery once patient medically cleared for IVC filter

## 2023-08-24 NOTE — CONSULT NOTE ADULT - ASSESSMENT
Pt is a 56 yr old female with a PMhx of HTN BIBEMS from [facility] for CTH showing a 2.7cm aneurysm in the left posterior communicating artery. EVD was placed urgently at [facility]. At [facility] she is POD 1 angio embolization of left pcomm aneurysm. She remains intubated and comatose. Cardiology was consulted for persistent ST depression in the lateral leads and troponin elevations.     #Review of Studies   TELEMETRY: 23 from midnight to 2am sinus tachycardia to the 120s, with ST depression present   EC23 NSR, normal axis, with ST depression in V3 2mm V4 2mm V5 1.5mm V6 2 mm, QT prolongation       #[id number] 2/2 to acute subarachnoid hemorrhage due to aneurysm rupture of left pcomm   #ST depressions   #QT prolongation on EKG from 23  -most likely in the setting of demand ischemia  -QTc prolongation most likely 2/2 to subarachnoid hemophage  -daily EKG        Pt is a 56 yr old female with a PMhx of HTN BIBEMS from [facility] for CTH showing a 2.7cm aneurysm in the left posterior communicating artery. EVD was placed urgently at [facility]. At [facility] she is POD 1 angio embolization of left pcomm aneurysm. She remains intubated and comatose. Cardiology was consulted for persistent ST depression in the lateral leads and troponin elevations.     #Review of Studies   TELEMETRY: 23 from midnight to 2am sinus tachycardia to the 120s, with ST depression present   EC23 NSR, normal axis, with ST depression in V3 2mm V4 2mm V5 1.5mm V6 2 mm, QT prolongation       #Subarachnoid hemorrhage   #Otio8HS (NSTEMI in anterolateral leads) 2/2 to acute subarachnoid hemorrhage due to aneurysm rupture of left pcomm   #ST depressions   #QT prolongation on EKG from 23  -most likely in the setting of demand ischemia  -QTc prolongation most likely 2/2 to subarachnoid hemophage  -troponin has continued to downtrend down form 633-->495--->429  -daily EKG, maintain BP <160 and HR          Pt is a 56 yr old female with a PMhx of HTN BIBEMS from [facility] for CTH showing a 2.7cm aneurysm in the left posterior communicating artery. EVD was placed urgently at [facility]. At [facility] she is POD 1 angio embolization of left pcomm aneurysm. She remains intubated and comatose. Cardiology was consulted for persistent ST depression in the lateral leads and troponin elevations.     #Review of Studies   TELEMETRY: 23 from midnight to 2am sinus tachycardia to the 120s, with ST depression present   EC23 NSR, normal axis, with ST depression in V3 2mm V4 2mm V5 1.5mm V6 2 mm, QT prolongation       #Subarachnoid hemorrhage   #NSTEM  #ST depressions   #QT prolongation on EKG from 23  -most likely in the setting of demand ischemia however cannot rule out underlying ischemic heart disease.  -QTc prolongation most likely 2/2 to subarachnoid hemophage  -troponin has continued to downtrend down form 633-->495--->429  -daily EKG,         Pt is a 56 yr old female with a PMhx of HTN BIBEMS from [facility] for CTH showing a 2.7cm aneurysm in the left posterior communicating artery. EVD was placed urgently at [facility]. At [facility] she is POD 1 angio embolization of left pcomm aneurysm. She remains intubated and comatose. Cardiology was consulted for persistent ST depression in the lateral leads and troponin elevations.     #Review of Studies   TELEMETRY: 23 from midnight to 2am sinus tachycardia to the 120s, with ST depression present   EC23 NSR, normal axis, with ST depression in V3 2mm V4 2mm V5 1.5mm V6 2 mm, QT prolongation       #Subarachnoid hemorrhage   #NSTEM  #ST depressions   #QT prolongation on EKG from 23  troponin has continued to downtrend down form 633-->495--->429  EKG with ST depressions v3 - v6  - suspect elevated troponin and EKG changes most likely in the setting of demand ischemia. She likely has underlying coronary artery disease; However, given grave prognosis, will not pursue ischemic workup at this time  - hold off on DAPT given extensive SAH/ IVH  - suspect QTc prolongation most likely 2/2 SAH, avoid QTC prolonging medications as able  - daily EKGs  - avoid qtc prolonging medications  - consider TTE for structural assessment

## 2023-08-24 NOTE — CONSULT NOTE ADULT - SUBJECTIVE AND OBJECTIVE BOX
HPI:  53F with PMH HTN was BIBEMS to [facility] after being found unresponsive by her son this morning. Patient’s last known well was 7pm last night (8/22/2023). As per [facility] EM resident, the patient was obtunded and intubated upon arrival in the emergency department. Stroke code was called and CTH was ordered showing diffuse SAH with extensive IVH. 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 EVD was placed urgently at [facility], 1g of keppra given. Per EM resident, pt does not take blood thinners. VSS. [facility] ED Attending Dr. Mccurdy initiated transfer to [facility]. EMS initiated norepinephrine during transport to [facility]. HH5 MF4 NIHSS 31 (23 Aug 2023 13:11)        Pt seen and examined at bedside, NAD, ROS s/f ?, otherwise remainder of ROS is unremarkable     ROS: Per HPI    PAST MEDICAL & SURGICAL HISTORY:  Hypertension        SOCIAL HISTORY:  FAMILY HISTORY:    ALLERGIES: 	  No Known Allergies          MEDICATIONS:  atorvastatin 80 milliGRAM(s) Oral at bedtime  cefTRIAXone   IVPB 2000 milliGRAM(s) IV Intermittent every 12 hours  chlorhexidine 0.12% Liquid 15 milliLiter(s) Oral Mucosa every 12 hours  chlorhexidine 2% Cloths 1 Application(s) Topical <User Schedule>  dextrose 5%. 500 milliLiter(s) IV Continuous <Continuous>  dextrose 5%. 1000 milliLiter(s) IV Continuous <Continuous>  insulin lispro (ADMELOG) corrective regimen sliding scale   SubCutaneous every 6 hours  levETIRAcetam  IVPB 500 milliGRAM(s) IV Intermittent every 12 hours  niMODipine 60 milliGRAM(s) Oral every 4 hours  norepinephrine Infusion 0.05 MICROgram(s)/kG/Min IV Continuous <Continuous>  pantoprazole  Injectable 40 milliGRAM(s) IV Push two times a day  potassium phosphate IVPB 30 milliMole(s) IV Intermittent once  senna 2 Tablet(s) Oral at bedtime      T(C): 36.8 (08-24-23 @ 09:12), Max: 38.8 (08-24-23 @ 05:40)  HR: 72 (08-24-23 @ 11:00) (56 - 116)  BP: 113/69 (08-24-23 @ 11:00) (95/70 - 131/62)  RR: 17 (08-24-23 @ 11:00) (12 - 18)  SpO2: 100% (08-24-23 @ 11:00) (95% - 100%)    08-23-23 @ 07:01  -  08-24-23 @ 07:00  --------------------------------------------------------  IN: 5995.6 mL / OUT: 9141 mL / NET: -3145.4 mL    08-24-23 @ 07:01  -  08-24-23 @ 12:11  --------------------------------------------------------  IN: 900 mL / OUT: 1337 mL / NET: -437 mL        PHYSICAL EXAM:    Constitutional: resting comfortably in bed; NAD  HEENT: NC/AT, PERRL, EOMI, anicteric sclera, no nasal discharge; uvula midline, no oropharyngeal erythema or exudates; MMM  Neck: supple; no thyromegaly, JVP ? cm H20, JVD +/-  Respiratory: CTA B/L; no W/R/R, no retractions  Cardiac: +S1/S2; RRR; no M/R/G; PMI non-displaced  Gastrointestinal: soft, NT/ND; no rebound or guarding; +BSx4  Extremities: WWP, no clubbing or cyanosis; no peripheral edema  Musculoskeletal: NROM x4; no joint swelling, tenderness or erythema  Vascular: 2+ radial, DP/PT pulses B/L  Dermatologic: skin warm, dry and intact; no rashes, wounds, or scars  Lymphatic: no submandibular or cervical LAD  Neurologic: AAOx3; CNII-XII grossly intact; no focal deficits        I&O's Summary    23 Aug 2023 07:01  -  24 Aug 2023 07:00  --------------------------------------------------------  IN: 5995.6 mL / OUT: 9141 mL / NET: -3145.4 mL    24 Aug 2023 07:01  -  24 Aug 2023 12:11  --------------------------------------------------------  IN: 900 mL / OUT: 1337 mL / NET: -437 mL        Weight (kg): 90 (08-23 @ 15:59)  LABS:	 	                        14.3   14.00 )-----------( 172      ( 24 Aug 2023 08:43 )             42.2     08-24    165<HH>  |  130<H>  |  11  ----------------------------<  171<H>  3.3<L>   |  25  |  0.63    Ca    9.1      24 Aug 2023 08:38  Phos  1.2     08-24  Mg     2.4     08-24    TPro  7.4  /  Alb  4.2  /  TBili  0.6  /  DBili  x   /  AST  63<H>  /  ALT  93<H>  /  AlkPhos  57  08-24    CARDIAC MARKERS ( 24 Aug 2023 08:38 )  x     / x     / 663 U/L / x     / 14.7 ng/mL  CARDIAC MARKERS ( 23 Aug 2023 16:06 )  x     / x     / 292 U/L / x     / 22.6 ng/mL      proBNP:   Lipid Profile:   HgA1c:   TSH: Thyroid Stimulating Hormone, Serum: 0.983 uIU/mL (08-23 @ 12:26)      TELEMETRY: 	    ECG:  	  RADIOLOGY:   ECHO:  STRESS:  CATH:   HPI:  53F with PMH HTN was BIBEMS to [facility] after being found unresponsive by her son this morning. Patient’s last known well was 7pm last night (2023). As per [facility] EM resident, the patient was obtunded and intubated upon arrival in the emergency department. Stroke code was called and CTH was ordered showing diffuse SAH with extensive IVH. 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 EVD was placed urgently at [facility], 1g of keppra given. Per EM resident, pt does not take blood thinners. VSS. [facility] ED Attending Dr. Mccurdy initiated transfer to [facility]. EMS initiated norepinephrine during transport to [facility]. HH5 MF4 NIHSS 31. She is POD 1 angio embolization of left pcomm aneurysm.     Pt seen and examined at bedside. Remains intubated and comatose. Overnight pt had increased urine output with a Na of 164 and was given .25 mcg of DDAVP IV, and urine output slowed. On levophed 0.05mcg/kg/min. Cardiology was consulted due to persistent ST depressions on telemetry. EKG Troponin elevated on admission has been downtrending from 633-->495--->429.      ROS: unable to obtain as pt is intubated and comatose        PAST MEDICAL & SURGICAL HISTORY:  Hypertension        SOCIAL HISTORY:  FAMILY HISTORY:    ALLERGIES: 	  No Known Allergies          MEDICATIONS:  atorvastatin 80 milliGRAM(s) Oral at bedtime  cefTRIAXone   IVPB 2000 milliGRAM(s) IV Intermittent every 12 hours  chlorhexidine 0.12% Liquid 15 milliLiter(s) Oral Mucosa every 12 hours  chlorhexidine 2% Cloths 1 Application(s) Topical <User Schedule>  dextrose 5%. 500 milliLiter(s) IV Continuous <Continuous>  dextrose 5%. 1000 milliLiter(s) IV Continuous <Continuous>  insulin lispro (ADMELOG) corrective regimen sliding scale   SubCutaneous every 6 hours  levETIRAcetam  IVPB 500 milliGRAM(s) IV Intermittent every 12 hours  niMODipine 60 milliGRAM(s) Oral every 4 hours  norepinephrine Infusion 0.05 MICROgram(s)/kG/Min IV Continuous <Continuous>  pantoprazole  Injectable 40 milliGRAM(s) IV Push two times a day  potassium phosphate IVPB 30 milliMole(s) IV Intermittent once  senna 2 Tablet(s) Oral at bedtime      T(C): 36.8 (23 @ 09:12), Max: 38.8 (23 @ 05:40)  HR: 72 (23 @ 11:00) (56 - 116)  BP: 113/69 (23 @ 11:00) (95/70 - 131/62)  RR: 17 (23 @ 11:00) (12 - 18)  SpO2: 100% (23 @ 11:00) (95% - 100%)    23 @ 07:01  -  23 @ 07:00  --------------------------------------------------------  IN: 5995.6 mL / OUT: 9141 mL / NET: -3145.4 mL    23 @ 07:01  -  23 @ 12:11  --------------------------------------------------------  IN: 900 mL / OUT: 1337 mL / NET: -437 mL        PHYSICAL EXAM:    Constitutional: comatose and intubated   Respiratory: CTA B/L; no W/R/R, no retractions  Cardiac: +S1/S2; RRR; no M/R/G; PMI non-displaced  Extremities: WWP, no clubbing or cyanosis; no peripheral edema          I&O's Summary    23 Aug 2023 07:  -  24 Aug 2023 07:00  --------------------------------------------------------  IN: 5995.6 mL / OUT: 9141 mL / NET: -3145.4 mL    24 Aug 2023 07:  -  24 Aug 2023 12:11  --------------------------------------------------------  IN: 900 mL / OUT: 1337 mL / NET: -437 mL        Weight (kg): 90 ( @ 15:59)  LABS:	 	                        14.3   14.00 )-----------( 172      ( 24 Aug 2023 08:43 )             42.2     08-    165<HH>  |  130<H>  |  11  ----------------------------<  171<H>  3.3<L>   |  25  |  0.63    Ca    9.1      24 Aug 2023 08:38  Phos  1.2     08  Mg     2.4         TPro  7.4  /  Alb  4.2  /  TBili  0.6  /  DBili  x   /  AST  63<H>  /  ALT  93<H>  /  AlkPhos  57  08    CARDIAC MARKERS ( 24 Aug 2023 08:38 )  x     / x     / 663 U/L / x     / 14.7 ng/mL  CARDIAC MARKERS ( 23 Aug 2023 16:06 )  x     / x     / 292 U/L / x     / 22.6 ng/mL      proBNP:   Lipid Profile:   HgA1c:   TSH: Thyroid Stimulating Hormone, Serum: 0.983 uIU/mL ( @ 12:26)      TELEMETRY: 23 from midnight to 2am sinus tachycardia to the 120s, with ST depression present   EC23 NSR, normal axis, with ST depression in V3 2mm V4 2mm V5 1.5mm V6 2 mm, QT prolongation

## 2023-08-24 NOTE — CONSULT NOTE ADULT - SUBJECTIVE AND OBJECTIVE BOX
HPI:  53F with PMH HTN was BIBEMS to [facility] after being found unresponsive by her son this morning. Patient’s last known well was 7pm last night (8/22/2023). As per [facility] EM resident, the patient was obtunded and intubated upon arrival in the emergency department. Stroke code was called and CTH was ordered showing diffuse SAH with extensive IVH. 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 EVD was placed urgently at [facility], 1g of keppra given. Per EM resident, pt does not take blood thinners. VSS. [facility] ED Attending Dr. Mccurdy initiated transfer to [facility]. EMS initiated norepinephrine during transport to [facility]. HH5 MF4 NIHSS 31 (23 Aug 2023 13:11)    PERTINENT PM/SXH:   Hypertension    FAMILY HISTORY:  No history of brain bleed in first degree relatives according to chart  Unable to obtain due to patient's encephalopathy    ITEMS NOT CHECKED ARE NOT PRESENT  SOCIAL HISTORY:   Significant other/partner:  [X] Estranged from   Children:  [X] 3 adult children   Substance hx:  []   Tobacco hx:  []   Alcohol hx: []   Home Opioid hx:  [] I-Stop Reference No:  - no active Rx's / see chart note  Living Situation: [X]Home  []Long term care  []Rehab []Other  Islam/Spiritual practice: ; Role of organized Christian [ ] important [ ] some [X] unable to assess  Coping: [] well [] with difficulty [] poor coping [X] unable to assess  Support system: [] strong [X] adequate [] inadequate    ADVANCE DIRECTIVES:    []MOLST  []Living Will  DECISION MAKER(s):  [] Health Care Proxy(s)  [X] Surrogate(s)  [] Guardian           Name(s)/Phone Number(s): 3 adult children, including Tomi and Tori Starr    BASELINE (I)ADLs (prior to admission):  [place]: [X]Total  [] Moderate []Dependent    ALLERGIES:  No Known Allergies    MEDICATIONS  (STANDING):  atorvastatin 80 milliGRAM(s) Oral at bedtime  cefTRIAXone   IVPB 2000 milliGRAM(s) IV Intermittent every 12 hours  chlorhexidine 0.12% Liquid 15 milliLiter(s) Oral Mucosa every 12 hours  chlorhexidine 2% Cloths 1 Application(s) Topical <User Schedule>  dextrose 5%. 500 milliLiter(s) (500 mL/Hr) IV Continuous <Continuous>  dextrose 5%. 1000 milliLiter(s) (125 mL/Hr) IV Continuous <Continuous>  heparin   Injectable 5000 Unit(s) SubCutaneous every 8 hours  insulin lispro (ADMELOG) corrective regimen sliding scale   SubCutaneous every 6 hours  levETIRAcetam  IVPB 500 milliGRAM(s) IV Intermittent every 12 hours  niMODipine 60 milliGRAM(s) Oral every 4 hours  norepinephrine Infusion 0.05 MICROgram(s)/kG/Min (8.44 mL/Hr) IV Continuous <Continuous>  pantoprazole  Injectable 40 milliGRAM(s) IV Push two times a day  senna 2 Tablet(s) Oral at bedtime    MEDICATIONS  (PRN):  sodium chloride 0.9% lock flush 10 milliLiter(s) IV Push every 1 hour PRN Pre/post blood products, medications, blood draw, and to maintain line patency      PRESENT SYMPTOMS/REVIEW OF SYSTEMS: [X]Unable to obtain due to poor mentation/encephalopathy  Source if other than patient:  []Family   []Team     Pain: [] yes [] no  QOL Impact -   Location -                    Aggravating Factors -  Quality -  Radiation -  Timing -  Severity (0-10 scale) -   Minimal Acceptable Level (0-10 scale) -    Dyspnea:                           []Mild  []Moderate []Severe  Anxiety:                             []Mild []Moderate []Severe  Fatigue:                             []Mild []Moderate []Severe  Nausea:                             []Mild []Moderate []Severe  Loss of Appetite:              []Mild []Moderate []Severe  Constipation:                    []Mild []Moderate []Severe    Other Symptoms:  [x]All Other Review Of Systems Negative     Palliative Performance Status Version 2:  10%  (Functional Assessment Tool)    PHYSICAL EXAM:  GENERAL:  []Alert  []Oriented x   []Lethargic  []Cachexia  [X]Unarousable  []Verbal  []Non-Verbal  Behavioral:   []Anxiety  []Delirium []Agitation [X]Calm  HEENT:  []Normal   []Dry mouth   [X]ET Tube  []Oral lesions  PULMONARY:   [X]Clear []Tachypnea  []Audible excessive secretions  []Normal Work of Breathing []Labored Breathing  []Rhonchi []Crackles []Wheezing  CARDIOVASCULAR:    [X]Regular Rate & Rhythm []Irregular []Tachy  []Justo  GASTROINTESTINAL:  [X]Soft  []Distended   []+BS  [X]Non tender []Tender  []PEG []OGT/ NGT  Last BM:  GENITOURINARY:  []Normal [] Incontinent   []Oliguria/Anuria   [X]Mccormick  MUSCULOSKELETAL:   []Normal   []Weakness  [X]Bed/Wheelchair bound []Edema  NEUROLOGIC:   []No focal deficits  []Cognitive impairment  []Dysphagia []Dysarthria []Paresis [X]Encephalopathic  SKIN:   [X]Normal   []Pressure ulcer(s)  []Rash    CRITICAL CARE:  []Shock Present  []Septic []Cardiogenic []Neurologic []Hypovolemic  []Vasopressors []Inotropes   []Respiratory failure present []Mechanical Ventilation []Non-invasive ventilatory support []High-Flow  []Acute  []Chronic []Hypoxic  []Hypercarbic  []Other organ failure    Vital Signs Last 24 Hrs  T(C): 37.1 (24 Aug 2023 14:10), Max: 38.8 (24 Aug 2023 05:40)  T(F): 98.8 (24 Aug 2023 14:10), Max: 101.8 (24 Aug 2023 05:40)  HR: 75 (24 Aug 2023 16:00) (56 - 116)  BP: 126/78 (24 Aug 2023 12:00) (95/70 - 131/62)  BP(mean): 97 (24 Aug 2023 12:00) (77 - 97)  RR: 17 (24 Aug 2023 16:00) (12 - 18)  SpO2: 100% (24 Aug 2023 16:00) (95% - 100%)    Parameters below as of 24 Aug 2023 16:00  Patient On (Oxygen Delivery Method): ventilator    O2 Concentration (%): 40    LABS:                        13.7   14.58 )-----------( 172      ( 24 Aug 2023 16:06 )             40.4   08-24    166<HH>  |  130<H>  |  9   ----------------------------<  206<H>  3.2<L>   |  25  |  0.60    Ca    8.9      24 Aug 2023 16:06  Phos  1.2     08-24  Mg     2.4     08-24    TPro  7.4  /  Alb  4.2  /  TBili  0.6  /  DBili  x   /  AST  63<H>  /  ALT  93<H>  /  AlkPhos  57  08-24    RADIOLOGY & ADDITIONAL STUDIES:  < from: CT Head No Cont (08.23.23 @ 16:38) >  Impression:    Status post: Mineralization left posterior communicating artery aneurysm   with extensive streak artifact. Right frontal EVD with the catheter tip   partially obscured by streak artifact although possibly extraventricular   location. Findings consistent with hydrocephalus. Extensive subarachnoid   and intraventricular hemorrhage is noted.    Findings were discussed with neurosurgical team by Dr. Aiden Pichardo   on 8/23/2023 4:46 PM    < end of copied text >  < from: CT Head No Cont (08.24.23 @ 05:44) >  IMPRESSION:  Since prior CT head (8/23/2023), new large left frontoparietal and small   right paramedian regions of hypodensity which may reflect edema and/or   ischemia.    Slight enlargement of the bilateral frontal and right temporal horns   possibly secondary to redistribution of hemorrhage and increasedgas   within the ventricles.    No new intracranial hemorrhage.    < end of copied text >      REFERRALS:  [x]Social Work  []Case management []PT/OT []Chaplaincy  []Hospice  [X]Patient/Family Support []Massage Therapy []Music Therapy    DISCUSSION OF CASE: Family - obtained additional history and to provide emotional support;  Primary team - discussed plan of care

## 2023-08-24 NOTE — PATIENT PROFILE ADULT - FALL HARM RISK - HARM RISK INTERVENTIONS

## 2023-08-24 NOTE — PROCEDURAL SAFETY CHECKLIST WITH OR WITHOUT SEDATION - NSPRESEDATIONFT_GEN_ALL_CORE
Physician confirms case reviewed for anesthesia consultation requirements.
- c/w home escitalopram 5mg daily and home Xanax 0.25mg daily PRN for anxiety  - ISTOP#: [id number]
Physician confirms case reviewed for anesthesia consultation requirements.

## 2023-08-24 NOTE — CONSULT NOTE ADULT - PROBLEM/RECOMMENDATION-4
Refill for: insulin glargine (Lantus SoloStar) 100 UNIT/ML pen-injector   LOV: 9/4/20  Last Refill: 9/4/20 - 1 package with 3 refills    DISPLAY PLAN FREE TEXT

## 2023-08-24 NOTE — CONSULT NOTE ADULT - ATTENDING COMMENTS
53F h/o HTN p/w unresponsiveness. Per son, patient was USOH, asymptomatic until 8/23, he found her unresponsive. Patient was sent to [facility], found to have diffuse SAH with extensive IVH. CTA showed 2.7cm aneurysm in the L posterior communicating artery. EVD was placed and was transferred to [facility] on the same day. Patient underwent angio coil embolization of giant L pcomm aneurysm on 8/23 and EVD exchange. Post-operatively on 8/24 she spiked 102 fever. BCx ngtd, UA neg, CSF WBC count 5, CSF PCR +HHV6 and H.flu. Glucose 68,  prot 128. WBC 18 then came down to 14. Cr 0.62. CXR neg. She was started on CTX and ID was consulted for fever work-up. Repeat CSF PCR obtained, and only HHV6 was positive.  Given CSF studies, suspect false positive H.flu result.  Unclear clinical significance of HHV6 positivity as most immunocompetent patient has mild self-limited illness at young age.  It may cause encephalitis rarely but I don't think it is related to her stroke. Fever can be due to aspiration event.  Given extensive bleeding, could be central fever.  Check COVID.  Cont CTX for now.  f/u cultures.      Team 1 will follow you.  Case d/w primary team.    April Barboza MD, MS  Infectious Disease attending  work cell [phone]   For any questions during evening/weekend/holiday, please page ID on call
Pt is a 53 yr old female with a PMhx of HTN BIBEMS from [facility], after she was found down by son with CTH showing diffuse SAH with extensive IVH s/p angio embolization of left pcomm aneurysm, with clinical course complicated by  L. MCA territory stroke, Hypernatremia secondary to diabetes insipidus, and NSTEMI. Cardiology is being consulted for Abnormal EKG in setting of elevated Cardiac Enzymes    -Review of Studies   - TELEMETRY: 23 from midnight to 2am sinus tachycardia to the 120s, with ST depression present   - EC23 NSR, normal axis, with ST depression in V3 2mm V4 2mm V5 1.5mm V6 2 mm, QT prolongation       #NSTEMI  - Patient admitted with  SAH with extensive IVH s/p angio embolization of left pcomm aneurysm, with clinical course complicated by  L. MCA territory stroke, with Troponemia to 495-->334 with ischemic EKG changes  - EKG reviewed with NSR, with 1-2 mm ST depression in V3-V6 with QT prolongation    - At this time NSTEMI likely 2/2 to significant demand however patient likely with underlying ischemic heart disease.   - GIven Extensive IVH and grave prognosis, will not pursue and ischemic workup at this time. Patient annot be placed on DAPT or Heparin gtt at this time due to high bleeding risk  - Will recommend continuing with Supportive therapy  - Can obtain Echo for structural assessment  - Please obtain daily EKGs

## 2023-08-24 NOTE — PATIENT PROFILE ADULT - NSPRONUTRITIONRISK_GEN_A_NUR
[FreeTextEntry1] : Severe OA both knees. Discussed options - failed injections, NSAIDs and PT in the past and has pain with ADLs - ROM right knee only to about 80, left to about 100. Will plan for right TKA. Last A1c 6.2, did well with weight loss after gastric sleeve. No h/O DVT. Concern for poor post op response due to ongoing poor ROM and mechanics. Advised patient that post op, would have greater difficulty in recovery & PT. Discussed procedure, recovery process, risks and benefits of TKA. 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 Approx. 3-4 months. Discussed pain mgmt expectations.\par 9/27/21 - she is planning TKA SAILAJA this week rt knee - left knee is severe and would like injection today \par 10/12/21: 2 wks s/p Right TKA. Doing well. PT reports trouble sleeping/heaviness feeling in the knee. this is normal Xrays well fixed. Discussed Post operative protocol. No bathing submerging 2-3 wks. Discussed importance of continued elevation. Renewed oxycodone. Continue HEP and PT\par 11/8 rt knee doing well happy ROM is 100 started at 80 -- Left knee is worse had recent Inj - we will try duexis that helped her in the past and if cont pain may repeat zilretta - she may need TKA on the left at some pain we discussed this today\par 12/13/21: Right TKA doing very well, cont HEP. Left knee worsening - inj today tolerated well, needs auth for zilretta and will return for this as soon as allowed. Considering TKA in the spring.\par 2/14/22: Left knee zilretta inj today tolerated well - plan for left TKA with sailaja after 5/14/22. Need CT to eval for bone loss.\par 5/24/22: left knee pain getting worse. Scheduled for L TKA in June. Risks and benefits discussed and all questions answered. recently had R TKA in September with no issues\par 6/21/22: 2 weeks postop L TKA still having a lot of pain. Switch pain meds to dilaudid, start PT and fu in 4 weeks\par 8/16/22: 2 months postop L TKA doing okay with a some pain on the lateral side - She recently went on vacation and had an increase in pain likely due to aggregation of knee - Renewed tramadol and follow up in 6 weeks - Right knee doing well. Back to work 8.29 - does have effusion - hold on aspiration may do it next visit \par \par 9/27/22: Knee slowly improving, only mild effusion but I don’t feel need for asp at this point and her ROM is similar to other side. Recent left shoulder pain, numbness and tingling - Recommend cyclobenzaprine and MDP. If continues, will do a further workup. No indicators present

## 2023-08-24 NOTE — PROGRESS NOTE ADULT - SUBJECTIVE AND OBJECTIVE BOX
SUBJECTIVE: Unable to assess d/t mental status.     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 IRRAflow EVD exchanged. O/n, UO increased, BMP/Urine studies monitored. Na 164, patient given stat dose of 0.25 mvg IV DDAVP, UO slowed. Spiked fever to 102, pancultured. @ 5 AM, ICP increased to 25-30 sustained, given 90 mg mannitol and taken for CTH. CTH demonstrates demarcated left frontoparietal hypodensity. Neuro exam unchanged.   8/24: POD 1 angio coil embolization left pcomm aneurysm.     Vital Signs Last 24 Hrs  T(C): 38.8 (24 Aug 2023 05:40), Max: 38.8 (24 Aug 2023 05:40)  T(F): 101.8 (24 Aug 2023 05:40), Max: 101.8 (24 Aug 2023 05:40)  HR: 92 (24 Aug 2023 04:58) (56 - 110)  BP: 129/68 (24 Aug 2023 01:00) (95/70 - 130/63)  BP(mean): 92 (24 Aug 2023 01:00) (72 - 92)  RR: 18 (24 Aug 2023 01:00) (12 - 18)  SpO2: 99% (24 Aug 2023 04:58) (95% - 100%)    Parameters below as of 24 Aug 2023 01:00  Patient On (Oxygen Delivery Method): ventilator    O2 Concentration (%): 40    I&O's Summary    23 Aug 2023 07:01  -  24 Aug 2023 06:23  --------------------------------------------------------  IN: 1903.6 mL / OUT: 4212 mL / NET: -2308.4 mL    PHYSICAL EXAM:  Constitutional: Patient laying supine in bed, obtunded. GCS 3.  Respiratory: intubated, diminished breath sounds throughout left lung  Cardiovascular: Regular, rate and rhythm   Gastrointestinal:  Soft, nontender, nondistended.  Vascular: Extremities warm, no ulcers, no discoloration of skin, DP 2+ b/l/PT 1+b/l  Neurological: Not opening eyes to voice or noxious stimuli, not FC  CNII-XII: left pupil 2.9mm and sluggish, right 2.0mm and sluggish (seen on pupilometer), corneal reflex present bilaterally, gag reflex intact  Motor: Not moving all 4 extremities to noxious stimuli  + right frontal IRRAflow EVD     LABS:                        15.0   15.93 )-----------( 245      ( 24 Aug 2023 01:19 )             44.3     08-24    159<H>  |  126<H>  |  10  ----------------------------<  148<H>  See Note   |  23  |  0.62    Ca    8.5      24 Aug 2023 04:48  Phos  1.2     08-24  Mg     2.4     08-24    TPro  7.4  /  Alb  4.2  /  TBili  0.6  /  DBili  x   /  AST  63<H>  /  ALT  93<H>  /  AlkPhos  57  08-24    PT/INR - ( 23 Aug 2023 16:06 )   PT: 11.9 sec;   INR: 1.05        PTT - ( 23 Aug 2023 16:06 )  PTT:34.0 sec  Urinalysis Basic - ( 24 Aug 2023 04:48 )    Color: x / Appearance: x / SG: x / pH: x  Gluc: 148 mg/dL / Ketone: x  / Bili: x / Urobili: x   Blood: x / Protein: x / Nitrite: x   Leuk Esterase: x / RBC: x / WBC x   Sq Epi: x / Non Sq Epi: x / Bacteria: x    CARDIAC MARKERS ( 23 Aug 2023 16:06 )  x     / x     / 292 U/L / x     / 22.6 ng/mL    CAPILLARY BLOOD GLUCOSE    POCT Blood Glucose.: 159 mg/dL (23 Aug 2023 19:14)    Drug Levels: [] N/A    CSF Analysis: [] N/A  RBC Count - Spinal Fluid: 45731 /uL (08-24 @ 02:48)  CSF Lymphocytes: 2 % (08-24 @ 02:48)  Glucose, CSF: 68 mg/dL (08-24 @ 02:48)  Protein, CSF: 128 mg/dL (08-24 @ 02:48)    Allergies    No Known Allergies    Intolerances    MEDICATIONS:  Antibiotics:    Neuro:  levETIRAcetam  IVPB 500 milliGRAM(s) IV Intermittent every 12 hours    Anticoagulation:    OTHER:  atorvastatin 80 milliGRAM(s) Oral at bedtime  chlorhexidine 0.12% Liquid 15 milliLiter(s) Oral Mucosa every 12 hours  chlorhexidine 2% Cloths 1 Application(s) Topical <User Schedule>  insulin lispro (ADMELOG) corrective regimen sliding scale   SubCutaneous three times a day before meals  mannitol 20% IVPB 90 Gram(s) IV Intermittent once  niMODipine 60 milliGRAM(s) Oral every 4 hours  norepinephrine Infusion 0.05 MICROgram(s)/kG/Min IV Continuous <Continuous>  pantoprazole  Injectable 40 milliGRAM(s) IV Push two times a day  senna 2 Tablet(s) Oral at bedtime    IVF:  lactated ringers. 1000 milliLiter(s) IV Continuous <Continuous>  potassium phosphate IVPB 15 milliMole(s) IV Intermittent once    CULTURES:    RADIOLOGY & ADDITIONAL TESTS:  < from: CT Head No Cont (08.24.23 @ 05:44) >    INTERPRETATION:  INDICATIONS: Intraventricular hemorrhage.    TECHNIQUE: Serial axial images were obtained from the skull base to the   vertex without the use of intravenous contrast. Sagittal and coronal   images were reformatted.    COMPARISON EXAMINATION: CT of the head from 8/23/2023.    FINDINGS:  Evaluation is limited secondary to metallic streak artifact from left   parasellar endovascular coil. Within this limitation:    VENTRICLES AND SULCI: Right frontal approach EVD with tip again appearing   extraventricular just right of the third ventricle, however localization   is limited secondary to metallic streak artifact from adjacent coil.   Increased gas within the bilateral frontal horns, right temporal horn,   and right lateral ventricle. Intraventricular hemorrhage within the   bilateral lateral ventricles, third ventricle, fourth ventricle, and   cerebellopontine cistern appears increased, possibly secondary to   redistribution. Findings contribute to slight enlargement of the   bilateral frontal and right temporal horns. There is also again layering   hemorrhage in the bilateral occipital horns.  EXTRA-AXIAL: Subarachnoid hemorrhage within the bilateral sylvian   fissures and a few bilateral cortical sulci, which appear more   conspicuous on the left when compared to prior. There is also   subarachnoid hemorrhage along the interhemispheric fissure.  INTRA-AXIAL: New left frontoparietal hypodensity possibly secondary to   hemorrhage versus ischemia. Additional new smaller focus of hyperdensity   in the right paramedian frontal lobe.  VISUALIZED SINUSES: No air-fluid levels are identified.  VISUALIZED MASTOIDS: Clear.  CALVARIUM: Right frontal scot hole. No acute calvarial fracture.  MISCELLANEOUS: None.    IMPRESSION:  Since prior CT head (8/23/2023), new large left frontoparietal and small   right paramedian regions of hypodensity which may reflect edema and/or   ischemia.    Slight enlargement of the bilateral frontal and right temporal horns   possibly secondary to redistribution of hemorrhage and increased gas   within the ventricles.    No new intracranial hemorrhage.    < end of copied text >    ASSESSMENT:  53F with PMH HTN was BIBEMS to [facility] after being found unresponsive by her son this morning. CTH with diffuse SAH with extensive IVH. 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 EVD was placed urgently at [facility], Transferred to [facility] for further intervention HH5 MF4 NIHSS 31 GCS 3. 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     PLAN:  Neuro:  - Neuro checks/vitals q1h   - Flat/bedrest x 4h   - Continue keppra for seizure prophylaxis  - IRRAflow EVD open @ 5 cmH2O, monitor ICPs and output  - [facility] 8/23 with EVD in place, IVH   - Nimodipine 60 mg q4h   - Stroke core measures     Cardiac:  - -150, levophed  - Trop elevated, trend  - Lipitor started  - Pending echo/EKG    Pulm:   - intubation, full vent support    GI:  - NPO for now  - bowel regimen, pending BM  - PPI BID - coffee ground output from OGT     Renal: Mild hematuria- pink tinged urine  - gaines placed at OSH, in exchange   - IVF while NPO  - strict I's and O's, euvolemia  - Given 0.25mcg DDAVP x 1  - DIC w/u  - If worsening, jaret blood, Urology consult     Endo:  - ISS   - a1c 6.2    ID:  - leukocytosis, will trend  - CSF HHV6, hemophilus  - ID consult    Heme:  - SCDs in place for DVT prophylaxis  - LE dopplers pending  - DIC w/u    Dispo:   - ICU status, full code, dispo pending    D/w Dr. Wooten, Dr. Agrawal and Dr. Marquis

## 2023-08-24 NOTE — PROGRESS NOTE ADULT - SUBJECTIVE AND OBJECTIVE BOX
NSCU ATTENDING -- ADDITIONAL PROGRESS NOTE    Nighttime rounds were performed     HPI:  54 y/o F with PMH HTN was brought in by EMS to [facility] after being found unresponsive by her son this morning. Patient’s last known well was 7pm last night (8/22/2023). As per [facility] EM resident, the patient was obtunded and intubated upon arrival in the emergency department. Stroke code was called and CTH was ordered showing diffuse SAH with extensive IVH. 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 EVD was placed urgently at [facility], 1g of Keppra given. Per EM resident, pt does not take blood thinners. VSS. [facility] ED Attending Dr. Mccurdy initiated transfer to [facility]. EMS initiated norepinephrine during transport to [facility]. HH5 MF4 NIHSS 31 (23 Aug 2023 13:11)    On admission, the patient was:  GCS: 3  Hunt-Stewart: 5  modified Rudd: 4  ICH score:  NIHSS:    *** HIGH RISK OF DVT PRESENT ON ADMISSION ***      ICU Vital Signs Last 24 Hrs  T(C): 38 (24 Aug 2023 21:47), Max: 38.8 (24 Aug 2023 05:40)  T(F): 100.4 (24 Aug 2023 21:47), Max: 101.8 (24 Aug 2023 05:40)  HR: 114 (24 Aug 2023 21:00) (68 - 116)  BP: 126/78 (24 Aug 2023 12:00) (113/69 - 131/62)  BP(mean): 97 (24 Aug 2023 12:00) (83 - 97)  ABP: 104/75 (24 Aug 2023 21:00) (100/57 - 145/84)  ABP(mean): 87 (24 Aug 2023 21:00) (72 - 104)  RR: 18 (24 Aug 2023 21:00) (15 - 18)  SpO2: 100% (24 Aug 2023 21:00) (95% - 100%)      08-23-23 @ 07:01  -  08-24-23 @ 07:00  --------------------------------------------------------  IN: 5995.6 mL / OUT: 9141 mL / NET: -3145.4 mL    08-24-23 @ 07:01  -  08-24-23 @ 22:09  --------------------------------------------------------  IN: 3701.4 mL / OUT: 4362 mL / NET: -660.6 mL      Mode: AC/ CMV (Assist Control/ Continuous Mandatory Ventilation), RR (machine): 12, TV (machine): 410, FiO2: 40, PEEP: 5, ITime: 1, MAP: 9, PIP: 19      EXAM:   Agustín Coma Scale: 3  General: Normocephalic, (+) EVD, laying in bed, unresponsive  HEENT: OG bilious,material, ETT  Neuro:    CN: PERRL 2mm and reactive with pupillometer, gaze is dysconjugate,  (+) corneals bilaterally, (+) cough,  (+) Dolls, overbreathes vent    Mot: Uppers 0/5, lower 0/5  Chest: Diminished B/L  Heart: Regular rate/rhythm, S1/S2  Abdomen: Soft, nontender, +BS  Extremities: No edema      MEDICATIONS:   acetaminophen   Oral Liquid .. 650 milliGRAM(s) Oral every 6 hours PRN  atorvastatin 80 milliGRAM(s) Oral at bedtime  cefTRIAXone   IVPB 2000 milliGRAM(s) IV Intermittent every 12 hours  chlorhexidine 0.12% Liquid 15 milliLiter(s) Oral Mucosa every 12 hours  chlorhexidine 2% Cloths 1 Application(s) Topical <User Schedule>  dextrose 5%. 1000 milliLiter(s) (1000 mL/Hr) IV Continuous <Continuous>  dextrose 5%. 1000 milliLiter(s) (1000 mL/Hr) IV Continuous <Continuous>  dextrose 5%. 500 milliLiter(s) (500 mL/Hr) IV Continuous <Continuous>  dextrose 5%. 1000 milliLiter(s) (125 mL/Hr) IV Continuous <Continuous>  heparin   Injectable 5000 Unit(s) SubCutaneous every 8 hours  insulin lispro (ADMELOG) corrective regimen sliding scale   SubCutaneous every 6 hours  levETIRAcetam  IVPB 500 milliGRAM(s) IV Intermittent every 12 hours  niMODipine 60 milliGRAM(s) Oral every 4 hours  norepinephrine Infusion 0.05 MICROgram(s)/kG/Min (8.44 mL/Hr) IV Continuous <Continuous>  pantoprazole  Injectable 40 milliGRAM(s) IV Push two times a day  potassium chloride   Solution 40 milliEquivalent(s) Oral every 4 hours  propofol Infusion 10 MICROgram(s)/kG/Min (5.4 mL/Hr) IV Continuous <Continuous>  senna 2 Tablet(s) Oral at bedtime  sodium chloride 0.9% lock flush 10 milliLiter(s) IV Push every 1 hour PRN      LABS:                    13.7   14.58 )-----------( 172      ( 24 Aug 2023 16:06 )             40.4     08-24    166<HH>  |  130<H>  |  9   ----------------------------<  206<H>  3.2<L>   |  25  |  0.60    Ca    8.9      24 Aug 2023 16:06  Phos  1.2     08-24  Mg     2.4     08-24    TPro  7.4  /  Alb  4.2  /  TBili  0.6  /  DBili  x   /  AST  63<H>  /  ALT  93<H>  /  AlkPhos  57  08-24    LIVER FUNCTIONS - ( 24 Aug 2023 01:19 )  Alb: 4.2 g/dL / Pro: 7.4 g/dL / ALK PHOS: 57 U/L / ALT: 93 U/L / AST: 63 U/L / GGT: x           ABG - ( 24 Aug 2023 01:21 )  pH, Arterial: 7.46  pH, Blood: x     /  pCO2: 39    /  pO2: 89    / HCO3: 28    / Base Excess: 3.7   /  SaO2: 98.8      Culture - CSF with Gram Stain (collected 08-24-23 @ 14:28)  Source: .CSF CSF  Gram Stain (08-24-23 @ 15:21):    No organisms seen    Few White blood cells    Culture - Blood (collected 08-24-23 @ 02:42)  Source: .Blood Blood-Peripheral  Preliminary Report (08-24-23 @ 16:00):    No growth at 12 hours    Culture - Blood (collected 08-24-23 @ 02:42)  Source: .Blood Blood-Peripheral  Preliminary Report (08-24-23 @ 16:00):    No growth at 12 hours      PROCEDURE  There is a giant aneurysm of left Pcomm measuring 2.3cm x 1.8cm.   It was microcatheterized and coil embolized successfully.       ASSESSMENT:  HH5 MF4 L. PCOMM giant aneurysm with SAH BD 2  Acute respiratory failure due above  LMCA territory acute stroke  Intracranial HTN  PNA  NSTEMI  Hypernatremia likely DI  DVT L popiteal and peroneal  HHV 6 positive  History of HTN    PLAN:  NEURO:  Q1h neurochecks/ Q1h pupillometry  S/P DSA/aneurysm treatment  Continue nimodipine 60mg Q4, baseline TCDs, euvolemia  IRRAflow EVD at 0cmH2O. Monitor output, ICPS, color  Sedation: Propofol RASS 0- neg   High grade SAH: Place on EEG  Bedrest for now.  Palliative care following    PULM:  Continue full vent support  Baseline CXR, ABG   VAP bundle    CARDIAC: NSTEMI ST depressions v3 - v6  -150  TTE: Suboptimal study. Repeat TTE with definity  Trend trops and monitor EKG daily  Continue statin  No antiplts for now given acute bleed  Cardiology following    GI:   Diet: TF via OG  PPI bid while intubated  LBM: Awaiting  Monitor LFTs. Monitor for GI bleed  Bowel regimen    /RENAL: Significant autodiuresis; concern for DI s/p DDAVP. Lactatemia.  Stat labs this pm.   Keep Mccormick for now  Replete lytes aggressively  Monitor UOP/volume status, BUN/Cr  Euvolemia/eunatremia  Continue D5W for now (bolused) Na goal 150- 155.  Trend lactate    HEME: SAH, Acute deep venous thrombosis: below the knee.  Acute DVT of the LEFT popliteal and peroneal veins.  Vasc surgery consulted  Maintain Hb > 7.0, PLT > 100,000  SCDs, heparin ppx    ID: Leukocytosis, fevers. R/O PNA  Blood NGTD  UA neg  Sputum GPC, procalcitonin: 0.7  CXR -clear. CSF NGTD  PCR- HHV6. ID following  On Ceftriaxone    ENDO: DI   Glucose goal 140- 180mg/DL  A1c: 6.2  TSH: 0.9  LDL: 164    MISC:  PICC line    SOCIAL/FAMILY:  [x] awaiting [] updated at bedside [] family meeting    CODE STATUS:  [x] Full Code [] DNR [] DNI [] Palliative/Comfort Care    DISPOSITION:  [x] ICU [] Stroke Unit [] Floor [] EMU [] RCU [] PCU    Time spent: 45 critical care minutes

## 2023-08-25 NOTE — PROGRESS NOTE ADULT - ASSESSMENT
Pt is a 56 yr old female with a PMhx of HTN BIBEMS from [facility] for CTH showing a 2.7cm aneurysm in the left posterior communicating artery. EVD was placed urgently at [facility]. At [facility] she is POD 1 angio embolization of left pcomm aneurysm. She remains intubated and comatose. Cardiology was consulted for persistent ST depression in the lateral leads and troponin elevations.     #Review of Studies   TELEMETRY: 23 from midnight to 2am sinus tachycardia to the 120s, with ST depression present   TELE: 23 ST depressions with no ectopic beats   EC23 NSR, normal axis, with ST depression in V3 2mm V4 2mm V5 1.5mm V6 2 mm, QT prolongation     LUKE: 23 The left ventricle is normal in size, wall thickness, and systolic function with a calculated ejection fraction of 70-75%. There are no regional wall motion abnormalities seen.    #Subarachnoid hemorrhage   #NSTEM  #ST depressions   #QT prolongation on EKG from 23  troponin has continued to downtrend down form 633-->495--->429  EKG with ST depressions v3 - v6  - suspect elevated troponin and EKG changes most likely in the setting of demand ischemia. She likely has underlying coronary artery disease; However, given grave prognosis, will not pursue ischemic workup at this time  - hold off on DAPT given extensive SAH/ IVH  - suspect QTc prolongation most likely 2/2 SAH, avoid QTC prolonging medications as able  - daily EKGs  - avoid qtc prolonging medications            Pt is a 56 yr old female with a PMhx of HTN BIBEMS from [facility] for CTH showing a 2.7cm aneurysm in the left posterior communicating artery. EVD was placed urgently at [facility]. At [facility] she is POD 1 angio embolization of left pcomm aneurysm. She remains intubated and comatose. Cardiology was consulted for persistent ST depression in the lateral leads and troponin elevations.     #Review of Studies   TELEMETRY: 23 from midnight to 2am sinus tachycardia to the 120s, with ST depression present   TELE: 23 ST depressions with no ectopic beats   EC23 NSR, normal axis, with ST depression in V3 2mm V4 2mm V5 1.5mm V6 2 mm, QT prolongation     LUKE: 23 The left ventricle is normal in size, wall thickness, and systolic function with a calculated ejection fraction of 70-75%. There are no regional wall motion abnormalities seen.    #Subarachnoid hemorrhage   #NSTEM  #ST depressions   #QT prolongation on EKG from 23  troponin has continued to downtrend down form 633-->495--->429--->334--->212  EKG with ST depressions v3 - v6  - suspect elevated troponin and EKG changes most likely in the setting of demand ischemia. She likely has underlying coronary artery disease; However, given grave prognosis, will not pursue ischemic workup at this time  - hold off on DAPT given extensive SAH/ IVH  - suspect QTc prolongation most likely 2/2 SAH, avoid QTC prolonging medications as able  - daily EKGs  - avoid qtc prolonging medications  - troponin has been downtrending, please stop trending troponin

## 2023-08-25 NOTE — PROGRESS NOTE ADULT - SUBJECTIVE AND OBJECTIVE BOX
Cardiology Consult    O/N:  Interval History/HPI: Pt seen and examined at bedside, NAD, no complaints at this time. ROS s/f ?, remainder of ROS otherwise unremarkable   Telemetry:    OBJECTIVE  T(C): 37.3 (08-25-23 @ 09:10), Max: 38.3 (08-24-23 @ 22:00)  HR: 115 (08-25-23 @ 12:00) (75 - 129)  BP: --  RR: 17 (08-25-23 @ 12:00) (15 - 20)  SpO2: 100% (08-25-23 @ 12:00) (95% - 100%)    08-24-23 @ 07:01  -  08-25-23 @ 07:00  --------------------------------------------------------  IN: 5897.9 mL / OUT: 5860 mL / NET: 37.9 mL    08-25-23 @ 07:01  -  08-25-23 @ 12:17  --------------------------------------------------------  IN: 579.7 mL / OUT: 1963 mL / NET: -1383.3 mL        PHYSICAL EXAM:    Constitutional: resting comfortably in bed; NAD  HEENT: NC/AT, PERRL, EOMI, anicteric sclera, no nasal discharge; uvula midline, no oropharyngeal erythema or exudates; MMM  Neck: supple; no thyromegaly, JVP ? cm H20, JVD +/-  Respiratory: CTA B/L; no W/R/R, no retractions  Cardiac: +S1/S2; RRR; no M/R/G; PMI non-displaced  Gastrointestinal: soft, NT/ND; no rebound or guarding; +BSx4  Extremities: WWP, no clubbing or cyanosis; no peripheral edema  Musculoskeletal: NROM x4; no joint swelling, tenderness or erythema  Vascular: 2+ radial, DP/PT pulses B/L  Dermatologic: skin warm, dry and intact; no rashes, wounds, or scars  Lymphatic: no submandibular or cervical LAD  Neurologic: AAOx3; CNII-XII grossly intact; no focal deficits    LABS:                        13.2   15.30 )-----------( 150      ( 25 Aug 2023 06:19 )             40.4     08-25    166<HH>  |  135<H>  |  6<L>  ----------------------------<  334<H>  3.5   |  24  |  0.53    Ca    9.3      25 Aug 2023 09:33  Phos  2.0     08-25  Mg     2.4     08-25    TPro  6.5  /  Alb  3.4  /  TBili  0.5  /  DBili  x   /  AST  48<H>  /  ALT  64<H>  /  AlkPhos  56  08-24    PT/INR - ( 23 Aug 2023 16:06 )   PT: 11.9 sec;   INR: 1.05          PTT - ( 23 Aug 2023 16:06 )  PTT:34.0 sec  Urinalysis Basic - ( 25 Aug 2023 09:33 )    Color: x / Appearance: x / SG: x / pH: x  Gluc: 334 mg/dL / Ketone: x  / Bili: x / Urobili: x   Blood: x / Protein: x / Nitrite: x   Leuk Esterase: x / RBC: x / WBC x   Sq Epi: x / Non Sq Epi: x / Bacteria: x        RADIOLOGY & ADDITIONAL TESTS:  Reviewed .    MEDICATIONS  (STANDING):  atorvastatin 80 milliGRAM(s) Oral at bedtime  cefTRIAXone   IVPB 2000 milliGRAM(s) IV Intermittent every 12 hours  chlorhexidine 0.12% Liquid 15 milliLiter(s) Oral Mucosa every 12 hours  chlorhexidine 2% Cloths 1 Application(s) Topical <User Schedule>  heparin   Injectable 5000 Unit(s) SubCutaneous every 8 hours  insulin lispro (ADMELOG) corrective regimen sliding scale   SubCutaneous every 6 hours  levETIRAcetam  IVPB 500 milliGRAM(s) IV Intermittent every 12 hours  niMODipine 60 milliGRAM(s) Oral every 4 hours  pantoprazole  Injectable 40 milliGRAM(s) IV Push two times a day  polyethylene glycol 3350 17 Gram(s) Oral two times a day  propofol Infusion 10 MICROgram(s)/kG/Min (5.4 mL/Hr) IV Continuous <Continuous>  senna 2 Tablet(s) Oral at bedtime  sodium chloride 0.225%. 1000 milliLiter(s) (100 mL/Hr) IV Continuous <Continuous>    MEDICATIONS  (PRN):  acetaminophen   Oral Liquid .. 650 milliGRAM(s) Oral every 6 hours PRN Temp greater or equal to 38C (100.4F), Mild Pain (1 - 3)  sodium chloride 0.9% lock flush 10 milliLiter(s) IV Push every 1 hour PRN Pre/post blood products, medications, blood draw, and to maintain line patency     Cardiology Consult    O/N:  Interval History/HPI: Pt seen and examined at bedside, NAD, remains intubated and sedated. Had a febrile episode overnight w/ temp of 101.5 on ceftriaxone IV. Echo findings from 8/25/23 demonstrate normal LV size and fxn w/ ejection fraction of 70-75%. There are no regional wall motion abnormalities seen.No other changes at this time.   Telemetry: ST depression with no ectopic beats     OBJECTIVE  T(C): 37.3 (08-25-23 @ 09:10), Max: 38.3 (08-24-23 @ 22:00)  HR: 115 (08-25-23 @ 12:00) (75 - 129)  BP: --  RR: 17 (08-25-23 @ 12:00) (15 - 20)  SpO2: 100% (08-25-23 @ 12:00) (95% - 100%)    08-24-23 @ 07:01  -  08-25-23 @ 07:00  --------------------------------------------------------  IN: 5897.9 mL / OUT: 5860 mL / NET: 37.9 mL    08-25-23 @ 07:01  -  08-25-23 @ 12:17  --------------------------------------------------------  IN: 579.7 mL / OUT: 1963 mL / NET: -1383.3 mL        PHYSICAL EXAM:    Constitutional: sedated and intubated   Respiratory: CTA B/L; no W/R/R, no retractions  Cardiac: +S1/S2; RRR; no M/R/G; PMI non-displaced  Extremities: WWP, no clubbing or cyanosis; no peripheral edema    LABS:                        13.2   15.30 )-----------( 150      ( 25 Aug 2023 06:19 )             40.4     08-25    166<HH>  |  135<H>  |  6<L>  ----------------------------<  334<H>  3.5   |  24  |  0.53    Ca    9.3      25 Aug 2023 09:33  Phos  2.0     08-25  Mg     2.4     08-25    TPro  6.5  /  Alb  3.4  /  TBili  0.5  /  DBili  x   /  AST  48<H>  /  ALT  64<H>  /  AlkPhos  56  08-24    PT/INR - ( 23 Aug 2023 16:06 )   PT: 11.9 sec;   INR: 1.05          PTT - ( 23 Aug 2023 16:06 )  PTT:34.0 sec  Urinalysis Basic - ( 25 Aug 2023 09:33 )    Color: x / Appearance: x / SG: x / pH: x  Gluc: 334 mg/dL / Ketone: x  / Bili: x / Urobili: x   Blood: x / Protein: x / Nitrite: x   Leuk Esterase: x / RBC: x / WBC x   Sq Epi: x / Non Sq Epi: x / Bacteria: x        RADIOLOGY & ADDITIONAL TESTS:  Reviewed .    MEDICATIONS  (STANDING):  atorvastatin 80 milliGRAM(s) Oral at bedtime  cefTRIAXone   IVPB 2000 milliGRAM(s) IV Intermittent every 12 hours  chlorhexidine 0.12% Liquid 15 milliLiter(s) Oral Mucosa every 12 hours  chlorhexidine 2% Cloths 1 Application(s) Topical <User Schedule>  heparin   Injectable 5000 Unit(s) SubCutaneous every 8 hours  insulin lispro (ADMELOG) corrective regimen sliding scale   SubCutaneous every 6 hours  levETIRAcetam  IVPB 500 milliGRAM(s) IV Intermittent every 12 hours  niMODipine 60 milliGRAM(s) Oral every 4 hours  pantoprazole  Injectable 40 milliGRAM(s) IV Push two times a day  polyethylene glycol 3350 17 Gram(s) Oral two times a day  propofol Infusion 10 MICROgram(s)/kG/Min (5.4 mL/Hr) IV Continuous <Continuous>  senna 2 Tablet(s) Oral at bedtime  sodium chloride 0.225%. 1000 milliLiter(s) (100 mL/Hr) IV Continuous <Continuous>    MEDICATIONS  (PRN):  acetaminophen   Oral Liquid .. 650 milliGRAM(s) Oral every 6 hours PRN Temp greater or equal to 38C (100.4F), Mild Pain (1 - 3)  sodium chloride 0.9% lock flush 10 milliLiter(s) IV Push every 1 hour PRN Pre/post blood products, medications, blood draw, and to maintain line patency     Cardiology Consult    O/N:  Interval History/HPI: Pt seen and examined at bedside, NAD, remains intubated and sedated. Had a febrile episode overnight w/ temp of 101.5 on ceftriaxone IV. Echo findings from 8/25/23 demonstrate normal LV size and fxn w/ ejection fraction of 70-75%. There are no regional wall motion abnormalities seen. No other changes at this time.   Telemetry: ST depression with no ectopic beats     OBJECTIVE  T(C): 37.3 (08-25-23 @ 09:10), Max: 38.3 (08-24-23 @ 22:00)  HR: 115 (08-25-23 @ 12:00) (75 - 129)  BP: --  RR: 17 (08-25-23 @ 12:00) (15 - 20)  SpO2: 100% (08-25-23 @ 12:00) (95% - 100%)    08-24-23 @ 07:01  -  08-25-23 @ 07:00  --------------------------------------------------------  IN: 5897.9 mL / OUT: 5860 mL / NET: 37.9 mL    08-25-23 @ 07:01  -  08-25-23 @ 12:17  --------------------------------------------------------  IN: 579.7 mL / OUT: 1963 mL / NET: -1383.3 mL        PHYSICAL EXAM:    Constitutional: sedated and intubated   Respiratory: CTA B/L; no W/R/R, no retractions  Cardiac: +S1/S2; RRR; no M/R/G; PMI non-displaced  Extremities: WWP, no clubbing or cyanosis; no peripheral edema    LABS:                        13.2   15.30 )-----------( 150      ( 25 Aug 2023 06:19 )             40.4     08-25    166<HH>  |  135<H>  |  6<L>  ----------------------------<  334<H>  3.5   |  24  |  0.53    Ca    9.3      25 Aug 2023 09:33  Phos  2.0     08-25  Mg     2.4     08-25    TPro  6.5  /  Alb  3.4  /  TBili  0.5  /  DBili  x   /  AST  48<H>  /  ALT  64<H>  /  AlkPhos  56  08-24    PT/INR - ( 23 Aug 2023 16:06 )   PT: 11.9 sec;   INR: 1.05          PTT - ( 23 Aug 2023 16:06 )  PTT:34.0 sec  Urinalysis Basic - ( 25 Aug 2023 09:33 )    Color: x / Appearance: x / SG: x / pH: x  Gluc: 334 mg/dL / Ketone: x  / Bili: x / Urobili: x   Blood: x / Protein: x / Nitrite: x   Leuk Esterase: x / RBC: x / WBC x   Sq Epi: x / Non Sq Epi: x / Bacteria: x        RADIOLOGY & ADDITIONAL TESTS:  Reviewed .    MEDICATIONS  (STANDING):  atorvastatin 80 milliGRAM(s) Oral at bedtime  cefTRIAXone   IVPB 2000 milliGRAM(s) IV Intermittent every 12 hours  chlorhexidine 0.12% Liquid 15 milliLiter(s) Oral Mucosa every 12 hours  chlorhexidine 2% Cloths 1 Application(s) Topical <User Schedule>  heparin   Injectable 5000 Unit(s) SubCutaneous every 8 hours  insulin lispro (ADMELOG) corrective regimen sliding scale   SubCutaneous every 6 hours  levETIRAcetam  IVPB 500 milliGRAM(s) IV Intermittent every 12 hours  niMODipine 60 milliGRAM(s) Oral every 4 hours  pantoprazole  Injectable 40 milliGRAM(s) IV Push two times a day  polyethylene glycol 3350 17 Gram(s) Oral two times a day  propofol Infusion 10 MICROgram(s)/kG/Min (5.4 mL/Hr) IV Continuous <Continuous>  senna 2 Tablet(s) Oral at bedtime  sodium chloride 0.225%. 1000 milliLiter(s) (100 mL/Hr) IV Continuous <Continuous>    MEDICATIONS  (PRN):  acetaminophen   Oral Liquid .. 650 milliGRAM(s) Oral every 6 hours PRN Temp greater or equal to 38C (100.4F), Mild Pain (1 - 3)  sodium chloride 0.9% lock flush 10 milliLiter(s) IV Push every 1 hour PRN Pre/post blood products, medications, blood draw, and to maintain line patency

## 2023-08-25 NOTE — PROGRESS NOTE ADULT - SUBJECTIVE AND OBJECTIVE BOX
INFECTIOUS DISEASES CONSULT FOLLOW-UP NOTE    INTERVAL HPI/OVERNIGHT EVENTS:      ROS:   Constitutional, eyes, ENT, cardiovascular, respiratory, gastrointestinal, genitourinary, integumentary, neurological, psychiatric and heme/lymph are otherwise negative other than noted above       ANTIBIOTICS/RELEVANT:    MEDICATIONS  (STANDING):  atorvastatin 80 milliGRAM(s) Oral at bedtime  cefTRIAXone   IVPB 2000 milliGRAM(s) IV Intermittent every 12 hours  chlorhexidine 0.12% Liquid 15 milliLiter(s) Oral Mucosa every 12 hours  chlorhexidine 2% Cloths 1 Application(s) Topical <User Schedule>  dextrose 5% Bolus 1000 milliLiter(s) IV Bolus once  dextrose 5%. 1000 milliLiter(s) (150 mL/Hr) IV Continuous <Continuous>  heparin   Injectable 5000 Unit(s) SubCutaneous every 8 hours  insulin lispro (ADMELOG) corrective regimen sliding scale   SubCutaneous every 6 hours  levETIRAcetam  IVPB 500 milliGRAM(s) IV Intermittent every 12 hours  niMODipine 60 milliGRAM(s) Oral every 4 hours  pantoprazole  Injectable 40 milliGRAM(s) IV Push two times a day  propofol Infusion 10 MICROgram(s)/kG/Min (5.4 mL/Hr) IV Continuous <Continuous>  senna 2 Tablet(s) Oral at bedtime    MEDICATIONS  (PRN):  acetaminophen   Oral Liquid .. 650 milliGRAM(s) Oral every 6 hours PRN Temp greater or equal to 38C (100.4F), Mild Pain (1 - 3)  sodium chloride 0.9% lock flush 10 milliLiter(s) IV Push every 1 hour PRN Pre/post blood products, medications, blood draw, and to maintain line patency        Vital Signs Last 24 Hrs  T(C): 36.5 (25 Aug 2023 04:58), Max: 38.3 (24 Aug 2023 22:00)  T(F): 97.7 (25 Aug 2023 04:58), Max: 100.9 (24 Aug 2023 22:00)  HR: 83 (25 Aug 2023 08:00) (68 - 129)  BP: 126/78 (24 Aug 2023 12:00) (113/69 - 131/62)  BP(mean): 97 (24 Aug 2023 12:00) (83 - 97)  RR: 16 (25 Aug 2023 08:00) (15 - 20)  SpO2: 100% (25 Aug 2023 08:00) (95% - 100%)    Parameters below as of 25 Aug 2023 08:00  Patient On (Oxygen Delivery Method): ventilator    O2 Concentration (%): 40    08-24-23 @ 07:01  -  08-25-23 @ 07:00  --------------------------------------------------------  IN: 5897.9 mL / OUT: 5860 mL / NET: 37.9 mL    08-25-23 @ 07:01  -  08-25-23 @ 08:18  --------------------------------------------------------  IN: 33.5 mL / OUT: 81 mL / NET: -47.5 mL    PHYSICAL EXAM:  Constitutional: Intubated, Obtunded, lying in bed  Pulmonary: Intubated, decreased breath sounds L>R  Heart: heart rate was normal and rhythm regular, normal S1 and S2  Vascular:. there was no peripheral edema  Abdomen: normal bowel sounds, soft, non-tender  Neurological: Does not open eyes to voice or noxious stimuli, pupils sluggish bilat. +Corneal reflex bilat, +gag reflex intact.          LABS:                        13.2   15.30 )-----------( 150      ( 25 Aug 2023 06:19 )             40.4     08-25    165<HH>  |  135<H>  |  6<L>  ----------------------------<  184<H>  4.0   |  22  |  0.54    Ca    9.1      25 Aug 2023 06:19  Phos  2.0     08-25  Mg     2.4     08-25    TPro  6.5  /  Alb  3.4  /  TBili  0.5  /  DBili  x   /  AST  48<H>  /  ALT  64<H>  /  AlkPhos  56  08-24    PT/INR - ( 23 Aug 2023 16:06 )   PT: 11.9 sec;   INR: 1.05          PTT - ( 23 Aug 2023 16:06 )  PTT:34.0 sec  Urinalysis Basic - ( 25 Aug 2023 06:19 )    Color: x / Appearance: x / SG: x / pH: x  Gluc: 184 mg/dL / Ketone: x  / Bili: x / Urobili: x   Blood: x / Protein: x / Nitrite: x   Leuk Esterase: x / RBC: x / WBC x   Sq Epi: x / Non Sq Epi: x / Bacteria: x        MICROBIOLOGY:      RADIOLOGY & ADDITIONAL STUDIES:  Reviewed INFECTIOUS DISEASES CONSULT FOLLOW-UP NOTE    INTERVAL HPI/OVERNIGHT EVENTS: NAEO      ROS:   Constitutional, eyes, ENT, cardiovascular, respiratory, gastrointestinal, genitourinary, integumentary, neurological, psychiatric and heme/lymph are otherwise negative other than noted above       ANTIBIOTICS/RELEVANT:    MEDICATIONS  (STANDING):  atorvastatin 80 milliGRAM(s) Oral at bedtime  cefTRIAXone   IVPB 2000 milliGRAM(s) IV Intermittent every 12 hours  chlorhexidine 0.12% Liquid 15 milliLiter(s) Oral Mucosa every 12 hours  chlorhexidine 2% Cloths 1 Application(s) Topical <User Schedule>  dextrose 5% Bolus 1000 milliLiter(s) IV Bolus once  dextrose 5%. 1000 milliLiter(s) (150 mL/Hr) IV Continuous <Continuous>  heparin   Injectable 5000 Unit(s) SubCutaneous every 8 hours  insulin lispro (ADMELOG) corrective regimen sliding scale   SubCutaneous every 6 hours  levETIRAcetam  IVPB 500 milliGRAM(s) IV Intermittent every 12 hours  niMODipine 60 milliGRAM(s) Oral every 4 hours  pantoprazole  Injectable 40 milliGRAM(s) IV Push two times a day  propofol Infusion 10 MICROgram(s)/kG/Min (5.4 mL/Hr) IV Continuous <Continuous>  senna 2 Tablet(s) Oral at bedtime    MEDICATIONS  (PRN):  acetaminophen   Oral Liquid .. 650 milliGRAM(s) Oral every 6 hours PRN Temp greater or equal to 38C (100.4F), Mild Pain (1 - 3)  sodium chloride 0.9% lock flush 10 milliLiter(s) IV Push every 1 hour PRN Pre/post blood products, medications, blood draw, and to maintain line patency        Vital Signs Last 24 Hrs  T(C): 36.5 (25 Aug 2023 04:58), Max: 38.3 (24 Aug 2023 22:00)  T(F): 97.7 (25 Aug 2023 04:58), Max: 100.9 (24 Aug 2023 22:00)  HR: 83 (25 Aug 2023 08:00) (68 - 129)  BP: 126/78 (24 Aug 2023 12:00) (113/69 - 131/62)  BP(mean): 97 (24 Aug 2023 12:00) (83 - 97)  RR: 16 (25 Aug 2023 08:00) (15 - 20)  SpO2: 100% (25 Aug 2023 08:00) (95% - 100%)    Parameters below as of 25 Aug 2023 08:00  Patient On (Oxygen Delivery Method): ventilator    O2 Concentration (%): 40    08-24-23 @ 07:01  -  08-25-23 @ 07:00  --------------------------------------------------------  IN: 5897.9 mL / OUT: 5860 mL / NET: 37.9 mL    08-25-23 @ 07:01  -  08-25-23 @ 08:18  --------------------------------------------------------  IN: 33.5 mL / OUT: 81 mL / NET: -47.5 mL    PHYSICAL EXAM:  Constitutional: Intubated, Obtunded, lying in bed  Pulmonary: Intubated, decreased breath sounds L>R  Heart: heart rate was normal and rhythm regular, normal S1 and S2  Vascular:. there was no peripheral edema  Abdomen: normal bowel sounds, soft, non-tender  Neurological: Does not open eyes to voice or noxious stimuli, pupils sluggish bilat. +Corneal reflex bilat, +gag reflex intact.          LABS:                        13.2   15.30 )-----------( 150      ( 25 Aug 2023 06:19 )             40.4     08-25    165<HH>  |  135<H>  |  6<L>  ----------------------------<  184<H>  4.0   |  22  |  0.54    Ca    9.1      25 Aug 2023 06:19  Phos  2.0     08-25  Mg     2.4     08-25    TPro  6.5  /  Alb  3.4  /  TBili  0.5  /  DBili  x   /  AST  48<H>  /  ALT  64<H>  /  AlkPhos  56  08-24    PT/INR - ( 23 Aug 2023 16:06 )   PT: 11.9 sec;   INR: 1.05          PTT - ( 23 Aug 2023 16:06 )  PTT:34.0 sec  Urinalysis Basic - ( 25 Aug 2023 06:19 )    Color: x / Appearance: x / SG: x / pH: x  Gluc: 184 mg/dL / Ketone: x  / Bili: x / Urobili: x   Blood: x / Protein: x / Nitrite: x   Leuk Esterase: x / RBC: x / WBC x   Sq Epi: x / Non Sq Epi: x / Bacteria: x        MICROBIOLOGY:      RADIOLOGY & ADDITIONAL STUDIES:  Reviewed

## 2023-08-25 NOTE — PROGRESS NOTE ADULT - SUBJECTIVE AND OBJECTIVE BOX
SUBJECTIVE: Unable to assess d/t mental status.     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 IRRAflow EVD exchanged.   8/24: POD 1 angio coil embolization left pcomm aneurysm. O/n, UO increased, BMP/Urine studies monitored. Na 164, patient given stat dose of 0.25 mvg IV DDAVP, UO slowed. Spiked fever to 102, pancultured. @ 5 AM, ICP increased to 25-30 sustained, given 90 mg mannitol and taken for CTH. CTH demonstrates demarcated left frontoparietal hypodensity. Hematuria also noted, clearing. EVD lowered to 0.   Ceftriaxone started for empiric coverage PNA/fever. LR fluids disocntinued and D5 IVF started. ST depressions on tele, EKG and cardiac enzymes. Na goal 150-155.  LE dopplers with L popliteal and peroneal DVTs. Vascular consulted. Start SQH for DVT ppx. PICC placed Isidro. Start TF. ICPs 20s. Given fentanyl 75 mcg x1 and propofol drip started. Serum Na uptrensing 166, however urine output stable. Gastric occult send out.   8/25: POD 2 angio coil embo left pcomm. Remains intubated, sedated with propofol. IRRAflow EVD @ 0.     Vital Signs Last 24 Hrs  T(C): 36.2 (25 Aug 2023 02:30), Max: 38.8 (24 Aug 2023 05:40)  T(F): 97.1 (25 Aug 2023 02:30), Max: 101.8 (24 Aug 2023 05:40)  HR: 93 (25 Aug 2023 04:00) (68 - 129)  BP: 126/78 (24 Aug 2023 12:00) (113/69 - 131/62)  BP(mean): 97 (24 Aug 2023 12:00) (83 - 97)  RR: 18 (25 Aug 2023 04:00) (15 - 20)  SpO2: 100% (25 Aug 2023 04:00) (95% - 100%)    Parameters below as of 25 Aug 2023 04:00  Patient On (Oxygen Delivery Method): ventilator    O2 Concentration (%): 40    I&O's Summary    23 Aug 2023 07:01  -  24 Aug 2023 07:00  --------------------------------------------------------  IN: 5995.6 mL / OUT: 9141 mL / NET: -3145.4 mL    24 Aug 2023 07:01  -  25 Aug 2023 04:11  --------------------------------------------------------  IN: 5727.4 mL / OUT: 5541 mL / NET: 186.4 mL    PHYSICAL EXAM:  Constitutional: Patient laying supine in bed, obtunded. GCS 3   Respiratory: intubated, diminished breath sounds throughout left lung  Cardiovascular: Regular, rate and rhythm   Gastrointestinal:  Soft, nontender, nondistended  Vascular: Extremities warm, no ulcers, no discoloration of skin, DP 2+ b/l/PT 1+b/l  Neurological: Not opening eyes to voice or noxious stimuli, not FC  CNII-XII: left pupil 2.9mm and sluggish, right 2.0mm and sluggish (seen on pupilometer), corneal reflex present bilaterally, gag reflex intact, + dolls   Motor: Not moving all 4 extremities to noxious stimuli  + right frontal IRRAflow EVD     LABS:                        13.7   14.58 )-----------( 172      ( 24 Aug 2023 16:06 )             40.4     08-24    159<H>  |  128<H>  |  7   ----------------------------<  382<H>  3.5   |  23  |  0.63    Ca    8.9      24 Aug 2023 22:30  Phos  1.6     08-24  Mg     2.4     08-24    TPro  6.5  /  Alb  3.4  /  TBili  0.5  /  DBili  x   /  AST  48<H>  /  ALT  64<H>  /  AlkPhos  56  08-24    PT/INR - ( 23 Aug 2023 16:06 )   PT: 11.9 sec;   INR: 1.05       PTT - ( 23 Aug 2023 16:06 )  PTT:34.0 sec  Urinalysis Basic - ( 24 Aug 2023 22:30 )    Color: x / Appearance: x / SG: x / pH: x  Gluc: 382 mg/dL / Ketone: x  / Bili: x / Urobili: x   Blood: x / Protein: x / Nitrite: x   Leuk Esterase: x / RBC: x / WBC x   Sq Epi: x / Non Sq Epi: x / Bacteria: x    CARDIAC MARKERS ( 24 Aug 2023 11:23 )  x     / x     / 615 U/L / x     / 13.7 ng/mL  CARDIAC MARKERS ( 24 Aug 2023 08:38 )  x     / x     / 663 U/L / x     / 14.7 ng/mL  CARDIAC MARKERS ( 23 Aug 2023 16:06 )  x     / x     / 292 U/L / x     / 22.6 ng/mL    CAPILLARY BLOOD GLUCOSE    POCT Blood Glucose.: 225 mg/dL (25 Aug 2023 00:59)  POCT Blood Glucose.: 153 mg/dL (24 Aug 2023 16:58)  POCT Blood Glucose.: 173 mg/dL (24 Aug 2023 11:13)  POCT Blood Glucose.: 153 mg/dL (24 Aug 2023 08:31)    Drug Levels: [] N/A    CSF Analysis: [] N/A    Allergies    No Known Allergies    Intolerances    MEDICATIONS:  Antibiotics:  cefTRIAXone   IVPB 2000 milliGRAM(s) IV Intermittent every 12 hours    Neuro:  acetaminophen   Oral Liquid .. 650 milliGRAM(s) Oral every 6 hours PRN  levETIRAcetam  IVPB 500 milliGRAM(s) IV Intermittent every 12 hours  propofol Infusion 10 MICROgram(s)/kG/Min IV Continuous <Continuous>    Anticoagulation:  heparin   Injectable 5000 Unit(s) SubCutaneous every 8 hours    OTHER:  atorvastatin 80 milliGRAM(s) Oral at bedtime  chlorhexidine 0.12% Liquid 15 milliLiter(s) Oral Mucosa every 12 hours  chlorhexidine 2% Cloths 1 Application(s) Topical <User Schedule>  insulin lispro (ADMELOG) corrective regimen sliding scale   SubCutaneous every 6 hours  niMODipine 60 milliGRAM(s) Oral every 4 hours  norepinephrine Infusion 0.05 MICROgram(s)/kG/Min IV Continuous <Continuous>  pantoprazole  Injectable 40 milliGRAM(s) IV Push two times a day  senna 2 Tablet(s) Oral at bedtime    IVF:  sodium chloride 0.45%. 1000 milliLiter(s) IV Continuous <Continuous>    CULTURES:  Culture Results:   No growth at 1 day. (08-24 @ 02:42)  Culture Results:   No growth at 1 day. (08-24 @ 02:42)    RADIOLOGY & ADDITIONAL TESTS:      ASSESSMENT:  53F with PMH HTN was BIBEMS to [facility] after being found unresponsive by her son this morning. CTH with diffuse SAH with extensive IVH. 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 EVD was placed urgently at [facility], Transferred to [facility] for further intervention HH5 MF4 NIHSS 31 GCS 3. 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     PLAN:  Neuro:  - Neuro checks/vitals q1h   - Flat/bedrest x 4h   - Continue keppra for seizure prophylaxis  - IRRAflow EVD open @ 0 cmH2O, monitor ICPs and output  - CTH 8/24: new large left frontoparietal and small right paramedian regions of hypodensity which may reflect edema and/or ischemia. Slight enlargement of the bilateral frontal and right temporal horns   - Nimodipine 60 mg q4h   - Stroke core measures     Cardiac:  - -150  - Lipitor started  - Cardiogloy following, NSTEMI: ST depressions on EKG, Qtc 554 8/24  - Continue trending troponin, downtrending   - Suboptimal echo 8/24, repeat TTE with definitiy     Pulm:   - + Intubation, VC AC 40/410/12/5    GI:  - + OGT, TF restarted trickle feeds   - Bowel regimen, pending BM  - PPI BID - coffee ground output from OGT, resolved    Renal:   - Mccormick placed at OSH, exchanged on 8/23, hematuria resolved  - 1/2 NS @ 100 cc/hr  - Na goal 150-155  - Strict I's and O's, euvolemia  - Elevated lactate, trend     Endo:  - ISS   - a1c 6.2  - DI: s/p DDAVP 8/23    ID:  - ID following, reccomend CTX 2g BID (8/24 - )  - CSF HHV6, hemophilus; repeat CSF cx HHV6  - Sputum cx 8/24 +GPC    Heme:  - SCD right leg only in place, SQH ppx   - LE dopplers 8/24 with left popliteal and peroneal DVT vasc surg c/s - IVCF when stable  - DIC w/u     Dispo:   - ICU status, full code, dispo pending  - Palliative following for GOC   - [facility] following     D/w Dr. Wooten, Dr. Agrawal and Dr. Silver

## 2023-08-25 NOTE — PROGRESS NOTE ADULT - ASSESSMENT
53y F found unresponsive at home, found to have diffuse SAH / IVH w/ 2.7cm aneurysm, s/p coil/embolization & EVD. Course c/b fever 102 & persistent leukocytosis, with unclear etiology. Possibly 2/2 +H Flu vs HHSV6 seen on CSF PCR, vs neurogenic iso SAH/stroke. Also possible aspiration iso stroke. Started on empiric CTX 2g for H flu in CSF.     CSF studies not consistent w/ meningitis, hence repeat studies.     Recommendations:   *INCOMPLETE NOTE* 53y F found unresponsive at home, found to have diffuse SAH / IVH w/ 2.7cm aneurysm, s/p coil/embolization & EVD. Course c/b fever 102 & persistent leukocytosis, with unclear etiology. Possibly 2/2 +H Flu vs HHSV6 seen on CSF PCR, vs neurogenic iso SAH/stroke. Also possible aspiration iso stroke. Started on empiric CTX 2g for H flu in CSF.     CSF studies not consistent w/ meningitis, repeat studies sent. ET Tube w/ MSSA as well.     Recommendations:   - c/w CTX 2g IV q12h to cover H Flu and & MSSA  - f/u CSF culture, BCx, and ET culture  - Team 1 will continue to follow you, case d/w primary team

## 2023-08-25 NOTE — PROGRESS NOTE ADULT - SUBJECTIVE AND OBJECTIVE BOX
NSCU ATTENDING -- ADDITIONAL PROGRESS NOTE    Nighttime rounds were performed     HPI:  52 y/o F with PMH HTN was brought in by EMS to [facility] after being found unresponsive by her son this morning. Patient’s last known well was 7pm last night (8/22/2023). As per [facility] EM resident, the patient was obtunded and intubated upon arrival in the emergency department. Stroke code was called and CTH was ordered showing diffuse SAH with extensive IVH. 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 EVD was placed urgently at [facility], 1g of Keppra given. Per EM resident, pt does not take blood thinners. VSS. [facility] ED Attending Dr. Mccurdy initiated transfer to [facility]. EMS initiated norepinephrine during transport to [facility]. HH5 MF4 NIHSS 31 (23 Aug 2023 13:11)    On admission, the patient was:  GCS: 3  Hunt-Stewart: 5  modified Rudd: 4  ICH score:  NIHSS:    *** HIGH RISK OF DVT PRESENT ON ADMISSION ***      ICU Vital Signs Last 24 Hrs  T(C): 37 (25 Aug 2023 18:10), Max: 39.2 (25 Aug 2023 14:08)  T(F): 98.6 (25 Aug 2023 18:10), Max: 102.6 (25 Aug 2023 14:08)  HR: 90 (25 Aug 2023 19:00) (83 - 129)  ABP: 123/76 (25 Aug 2023 19:00) (93/64 - 134/69)  ABP(mean): 94 (25 Aug 2023 19:00) (71 - 94)  RR: 17 (25 Aug 2023 19:00) (15 - 22)  SpO2: 100% (25 Aug 2023 19:00) (98% - 100%)      08-24-23 @ 07:01  -  08-25-23 @ 07:00  --------------------------------------------------------  IN: 5897.9 mL / OUT: 5860 mL / NET: 37.9 mL    08-25-23 @ 07:01  -  08-25-23 @ 19:31  --------------------------------------------------------  IN: 3357.5 mL / OUT: 4867 mL / NET: -1509.5 mL      Mode: AC/ CMV (Assist Control/ Continuous Mandatory Ventilation), RR (machine): 12, TV (machine): 410, FiO2: 40, PEEP: 5, ITime: 1, MAP: 9, PIP: 18      EXAM:   [place] Coma Scale: 3  General: Normocephalic, (+) EVD, laying in bed, unresponsive  HEENT: OG bilious,material, ETT  Neuro:    CN: PERRL 2mm and reactive with pupillometer, gaze is dysconjugate,  (+) corneals bilaterally, (+) cough,  (+) Dolls, overbreathes vent    Mot: Uppers 0/5, lower 0/5  Chest: Diminished B/L  Heart: Regular rate/rhythm, S1/S2  Abdomen: Soft, nontender, +BS  Extremities: No edema      MEDICATIONS:   acetaminophen   Oral Liquid .. 650 milliGRAM(s) Oral every 6 hours PRN  atorvastatin 80 milliGRAM(s) Oral at bedtime  cefTRIAXone   IVPB 2000 milliGRAM(s) IV Intermittent every 12 hours  chlorhexidine 0.12% Liquid 15 milliLiter(s) Oral Mucosa every 12 hours  chlorhexidine 2% Cloths 1 Application(s) Topical <User Schedule>  dextrose 5%. 1000 milliLiter(s) (100 mL/Hr) IV Continuous <Continuous>  dextrose 50% Injectable 50 milliLiter(s) IV Push every 15 minutes  heparin   Injectable 5000 Unit(s) SubCutaneous every 8 hours  insulin lispro (ADMELOG) corrective regimen sliding scale   SubCutaneous every 6 hours  insulin regular Infusion 3 Unit(s)/Hr (3 mL/Hr) IV Continuous <Continuous>  levETIRAcetam  IVPB 500 milliGRAM(s) IV Intermittent every 12 hours  niMODipine 60 milliGRAM(s) Oral every 4 hours  pantoprazole  Injectable 40 milliGRAM(s) IV Push two times a day  polyethylene glycol 3350 17 Gram(s) Oral two times a day  potassium chloride   Solution 40 milliEquivalent(s) Oral once  propofol Infusion 10 MICROgram(s)/kG/Min (5.4 mL/Hr) IV Continuous <Continuous>  senna 2 Tablet(s) Oral at bedtime  sodium chloride 0.9% lock flush 10 milliLiter(s) IV Push every 1 hour PRN      LABS:                    13.2   15.30 )-----------( 150      ( 25 Aug 2023 06:19 )             40.4     08-25    165<HH>  |  135<H>  |  7   ----------------------------<  396<H>  3.4<L>   |  21<L>  |  0.78    Ca    9.1      25 Aug 2023 17:58  Phos  2.0     08-25  Mg     2.3     08-25    TPro  7.1  /  Alb  3.5  /  TBili  0.5  /  DBili  x   /  AST  40  /  ALT  64<H>  /  AlkPhos  67  08-25    LIVER FUNCTIONS - ( 25 Aug 2023 12:30 )  Alb: 3.5 g/dL / Pro: 7.1 g/dL / ALK PHOS: 67 U/L / ALT: 64 U/L / AST: 40 U/L / GGT: x           ABG - ( 25 Aug 2023 06:34 )  pH, Arterial: 7.39  pH, Blood: x     /  pCO2: 40    /  pO2: 159   / HCO3: 24    / Base Excess: -0.7  /  SaO2: 100.0         Culture - CSF with Gram Stain (collected 08-24-23 @ 14:28)  Source: .CSF CSF  Gram Stain (08-24-23 @ 15:21):    No organisms seen    Few White blood cells    Culture - Blood (collected 08-24-23 @ 02:42)  Source: .Blood Blood-Peripheral  Preliminary Report (08-24-23 @ 16:00):    No growth at 12 hours    Culture - Blood (collected 08-24-23 @ 02:42)  Source: .Blood Blood-Peripheral  Preliminary Report (08-24-23 @ 16:00):    No growth at 12 hours      PROCEDURE  There is a giant aneurysm of left PComm measuring 2.3cm x 1.8cm.   It was microcatheterized and coil embolized successfully.       ASSESSMENT:  HH5 MF4 L. PCOMM giant aneurysm with SAH BD 3  Acute respiratory failure due above  LMCA territory acute stroke  Intracranial HTN  PNA  NSTEMI  Hypernatremia likely DI  DVT L popiteal and peroneal  HHV 6 positive  History of HTN    PLAN:  NEURO:  Q1h neurochecks/ Q1h pupillometry  S/P DSA/aneurysm treatment  Continue nimodipine 60mg Q4, baseline TCDs, euvolemia  IRRAflow EVD at 0cmH2O. Monitor output, ICPS, color  Sedation: Propofol RASS 0- neg   High grade SAH: Place on EEG  Bedrest for now.  Palliative care following    PULM:  Continue full vent support  Baseline CXR, ABG   VAP bundle    CARDIAC: NSTEMI ST depressions v3 - v6  -150  TTE: Suboptimal study. Repeat TTE with definity  Trend trops and monitor EKG daily  Continue statin  No antiplts for now given acute bleed  Cardiology following    GI:   Diet: TF via OG  PPI bid while intubated  LBM: Awaiting  Monitor LFTs. Monitor for GI bleed  Bowel regimen    /RENAL: Significant autodiuresis; concern for DI s/p DDAVP. Lactatemia.  Stat labs this pm.   Keep Mccormick for now  Replete lytes aggressively  Monitor UOP/volume status, BUN/Cr  Euvolemia/eunatremia  Continue D5W for now (bolused) Na goal 150- 155.  Trend lactate    HEME: SAH, Acute deep venous thrombosis: below the knee.  Acute DVT of the LEFT popliteal and peroneal veins.  Vasc surgery consulted  Maintain Hb > 7.0, PLT > 100,000  SCDs, heparin ppx    ID: Leukocytosis, fevers. R/O PNA  Blood NGTD  UA neg  Sputum GPC, procalcitonin: 0.7  CXR -clear. CSF NGTD  PCR- HHV6. ID following  On Ceftriaxone    ENDO: DI   Glucose goal 140- 180mg/DL  A1c: 6.2  TSH: 0.9  LDL: 164    MISC:  PICC line    SOCIAL/FAMILY:  [x] awaiting [] updated at bedside [] family meeting    CODE STATUS:  [x] Full Code [] DNR [] DNI [] Palliative/Comfort Care    DISPOSITION:  [x] ICU [] Stroke Unit [] Floor [] EMU [] RCU [] PCU    Time spent: 45 critical care minutes         NSCU ATTENDING -- ADDITIONAL PROGRESS NOTE    Nighttime rounds were performed     HPI:  54 y/o F with PMH HTN was brought in by EMS to [facility] after being found unresponsive by her son this morning. Patient’s last known well was 7pm last night (8/22/2023). As per [facility] EM resident, the patient was obtunded and intubated upon arrival in the emergency department. Stroke code was called and CTH was ordered showing diffuse SAH with extensive IVH. 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 EVD was placed urgently at [facility], 1g of Keppra given. Per EM resident, pt does not take blood thinners. VSS. [facility] ED Attending Dr. Mccurdy initiated transfer to [facility]. EMS initiated norepinephrine during transport to [facility]. HH5 MF4 NIHSS 31 (23 Aug 2023 13:11)    On admission, the patient was:  GCS: 3  Hunt-Stewart: 5  modified Rudd: 4  ICH score:  NIHSS:    *** HIGH RISK OF DVT PRESENT ON ADMISSION ***      ICU Vital Signs Last 24 Hrs  T(C): 37 (25 Aug 2023 18:10), Max: 39.2 (25 Aug 2023 14:08)  T(F): 98.6 (25 Aug 2023 18:10), Max: 102.6 (25 Aug 2023 14:08)  HR: 90 (25 Aug 2023 19:00) (83 - 129)  ABP: 123/76 (25 Aug 2023 19:00) (93/64 - 134/69)  ABP(mean): 94 (25 Aug 2023 19:00) (71 - 94)  RR: 17 (25 Aug 2023 19:00) (15 - 22)  SpO2: 100% (25 Aug 2023 19:00) (98% - 100%)      08-24-23 @ 07:01  -  08-25-23 @ 07:00  --------------------------------------------------------  IN: 5897.9 mL / OUT: 5860 mL / NET: 37.9 mL    08-25-23 @ 07:01  -  08-25-23 @ 19:31  --------------------------------------------------------  IN: 3357.5 mL / OUT: 4867 mL / NET: -1509.5 mL      Mode: AC/ CMV (Assist Control/ Continuous Mandatory Ventilation), RR (machine): 12, TV (machine): 410, FiO2: 40, PEEP: 5, ITime: 1, MAP: 9, PIP: 18      EXAM:   [place] Coma Scale: 3  General: Normocephalic, (+) EVD, laying in bed, unresponsive  HEENT: OG bilious,material, ETT  Neuro:    CN: PERRL 2mm and reactive with pupillometer, gaze is dysconjugate, eye bobbing movements, (+) corneals bilaterally, (+) cough,  (+) dolls, overbreathes vent    Mot: Uppers 0/5, lower 0/5  Chest: Diminished B/L  Heart: Regular rate/rhythm, S1/S2  Abdomen: Soft, nontender, +BS  Extremities: No edema      MEDICATIONS:   acetaminophen   Oral Liquid .. 650 milliGRAM(s) Oral every 6 hours PRN  atorvastatin 80 milliGRAM(s) Oral at bedtime  cefTRIAXone   IVPB 2000 milliGRAM(s) IV Intermittent every 12 hours  chlorhexidine 0.12% Liquid 15 milliLiter(s) Oral Mucosa every 12 hours  chlorhexidine 2% Cloths 1 Application(s) Topical <User Schedule>  dextrose 5%. 1000 milliLiter(s) (100 mL/Hr) IV Continuous <Continuous>  dextrose 50% Injectable 50 milliLiter(s) IV Push every 15 minutes  heparin   Injectable 5000 Unit(s) SubCutaneous every 8 hours  insulin lispro (ADMELOG) corrective regimen sliding scale   SubCutaneous every 6 hours  insulin regular Infusion 3 Unit(s)/Hr (3 mL/Hr) IV Continuous <Continuous>  levETIRAcetam  IVPB 500 milliGRAM(s) IV Intermittent every 12 hours  niMODipine 60 milliGRAM(s) Oral every 4 hours  pantoprazole  Injectable 40 milliGRAM(s) IV Push two times a day  polyethylene glycol 3350 17 Gram(s) Oral two times a day  potassium chloride   Solution 40 milliEquivalent(s) Oral once  propofol Infusion 10 MICROgram(s)/kG/Min (5.4 mL/Hr) IV Continuous <Continuous>  senna 2 Tablet(s) Oral at bedtime  sodium chloride 0.9% lock flush 10 milliLiter(s) IV Push every 1 hour PRN      LABS:                    13.2   15.30 )-----------( 150      ( 25 Aug 2023 06:19 )             40.4     08-25    165<HH>  |  135<H>  |  7   ----------------------------<  396<H>  3.4<L>   |  21<L>  |  0.78    Ca    9.1      25 Aug 2023 17:58  Phos  2.0     08-25  Mg     2.3     08-25    TPro  7.1  /  Alb  3.5  /  TBili  0.5  /  DBili  x   /  AST  40  /  ALT  64<H>  /  AlkPhos  67  08-25    LIVER FUNCTIONS - ( 25 Aug 2023 12:30 )  Alb: 3.5 g/dL / Pro: 7.1 g/dL / ALK PHOS: 67 U/L / ALT: 64 U/L / AST: 40 U/L / GGT: x           ABG - ( 25 Aug 2023 06:34 )  pH, Arterial: 7.39  pH, Blood: x     /  pCO2: 40    /  pO2: 159   / HCO3: 24    / Base Excess: -0.7  /  SaO2: 100.0         Culture - CSF with Gram Stain (collected 08-24-23 @ 14:28)  Source: .CSF CSF  Gram Stain (08-24-23 @ 15:21):    No organisms seen    Few White blood cells    Culture - Blood (collected 08-24-23 @ 02:42)  Source: .Blood Blood-Peripheral  Preliminary Report (08-24-23 @ 16:00):    No growth at 12 hours    Culture - Blood (collected 08-24-23 @ 02:42)  Source: .Blood Blood-Peripheral  Preliminary Report (08-24-23 @ 16:00):    No growth at 12 hours      PROCEDURE  There is a giant aneurysm of left PComm measuring 2.3cm x 1.8cm.   It was microcatheterized and coil embolized successfully.       ASSESSMENT:  HH5 MF4 L. PCOMM giant aneurysm with SAH BD 3  Acute respiratory failure due above  LMCA territory acute stroke  Intracranial HTN  PNA  NSTEMI  Hypernatremia likely DI  DVT L popiteal and peroneal  HHV 6 positive  History of HTN    PLAN:  NEURO:  Q1h neurochecks/ Q1h pupillometry  S/P DSA/aneurysm treatment  Continue nimodipine 60mg Q4, baseline TCDs, euvolemia  IRRAflow EVD at 0cmH2O. Monitor output, ICPS, color  Refractory intracranial HTN: Mannitol prn (target Osm gap <20), Na currently >155  Sedation: Propofol RASS -4 to 05. Burst suppression for ICP management  High grade SAH: On EEG. NO seizures. Target burst suppression  Bedrest for now.  Palliative care following    PULM:  Continue full vent support  Baseline CXR, ABG   VAP bundle    CARDIAC: NSTEMI ST depressions v3 - v6  -150  TTE: Suboptimal study. Repeat TTE with definity; EF 70-75%  Trend trops and monitor EKG daily  Continue statin  No antiplts for now given acute bleed  Cardiology following    GI:   Diet: TF via OG- glucerna  PPI bid while intubated  LBM: Awaiting. Augment bowel regimen  Monitor LFTs. Monitor for GI bleed  Bowel regimen    /RENAL: Significant autodiuresis; concern for DI s/p DDAVP. Lactatemia.  Keep Mccormick for now  Replete lytes aggressively  Monitor UOP/volume status, BUN/Cr. Euvolemia/eunatremia  Continue D5W for now (bolused) Na goal 150- 155. BMPs Q4  Trend lactate 2.0    HEME: SAH, Acute deep venous thrombosis: below the knee.  Acute DVT of the LEFT popliteal and peroneal veins.  Vasc surgery consulted  Maintain Hb > 7.0, PLT > 100,000  SCDs, heparin ppx    ID: Leukocytosis, fevers. PNA MSSA  Blood NGTD  UA neg  Sputum GPC, procalcitonin: 0.7  CXR -clear. CSF NGTD  PCR- HHV6. ID following  On Ceftriaxone    ENDO: DI   Glucose goal 140- 180mg/DL  A1c: 6.2  TSH: 0.9  LDL: 164  Start lantus 16u    MISC:  PICC line    SOCIAL/FAMILY:  [x] awaiting [] updated at bedside [] family meeting    CODE STATUS:  [x] Full Code [] DNR [] DNI [] Palliative/Comfort Care    DISPOSITION:  [x] ICU [] Stroke Unit [] Floor [] EMU [] RCU [] PCU    Time spent: 45 critical care minutes

## 2023-08-25 NOTE — PROGRESS NOTE ADULT - ATTENDING COMMENTS
#Fever, SAH, H. flu infection, PSA     Febrile to 100.9.  Patient remains intubated and sedated.  WBC 15, CSF culture growing bacteria - ID pending , suspect H.flu.  BCx ngtd.  ET culture growing MSSA.   Cont CTX 2g IV q12h - to cover both H.flu and MSSA.  f/u CSF culture, BCx, and ET culture.      Team 1 will follow you.  Case d/w primary team.    April Barboza MD, MS  Infectious Disease attending  work cell [phone]   For any questions during evening/weekend/holiday, please page ID on call
Pt is a 53 yr old female with a PMhx of HTN BIBEMS from [facility], after she was found down by son with CTH showing diffuse SAH with extensive IVH s/p angio embolization of left pcomm aneurysm, with clinical course complicated by  L. MCA territory stroke, Hypernatremia secondary to diabetes insipidus, CSF studies growing H. flu infection, and NSTEMI. Cardiology is being consulted for Abnormal EKG in setting of elevated Cardiac Enzymes    -Review of Studies   - TELEMETRY: 23 from midnight to 2am sinus tachycardia to the 120s, with ST depression present   - EC23 NSR, normal axis, with ST depression in V3 2mm V4 2mm V5 1.5mm V6 2 mm, QT prolongation     - Echo 23: Echo with Normal Biventricular function without RWMA. EF 65-70%    #NSTEMI  - Patient admitted with  SAH with extensive IVH s/p angio embolization of left pcomm aneurysm, with clinical course complicated by  L. MCA territory stroke, with Troponemia to 495-->334 with ischemic EKG changes  - EKG from  reviewed with NSR, with 1-2 mm ST depression in V3-V6 with QT prolongation    - Echo with Normal Biventricular function without RWMA  - At this time NSTEMI likely 2/2 to significant demand however patient likely with underlying ischemic heart disease.   - Given Extensive IVH and grave prognosis, will not pursue and ischemic workup at this time. Patient cannot be placed on DAPT or Heparin gtt at this time due to high bleeding risk  - Will recommend continuing with Supportive therapy  - Please obtain daily EKGs.

## 2023-08-25 NOTE — PROGRESS NOTE ADULT - SUBJECTIVE AND OBJECTIVE BOX
***********************************************  ADULT NSICU PROGRESS NOTE  RAPHAEL CAMACHO [id number] [facility] [id number] [id number]  ***********************************************    INTERVAL:    ROS: negative except per mentioned above in 24h interval events.      EXAM:     [place] Coma Scale: 1/1T/1    General: normocephalic, (+)EVD, laying in bed, unresponsive  Neuro     MS: [place] Coma Scale: 1/1T/1    CN: PERRL L 3mm > R 2mm, gaze is conjugate, face symmetric, hearing grossly intact, (+)corneals bilaterally, (+)cough    Mot: bulk normal, tone normal, no movement to noxious stimuli throughout  Chest: nonlabored respirations, no adventitious lung sounds bilaterally, heart regular rate/rhythm, present S1/S2, no murmurs or rubs  Abdomen: nondistended, soft and nontender without peritoneal signs, normoactive bowel sounds  Extremities: no clubbing, well-perfused, no edema    Vital Signs Last 24 Hrs  T(C): 36.8 (24 Aug 2023 09:12), Max: 38.8 (24 Aug 2023 05:40)  T(F): 98.3 (24 Aug 2023 09:12), Max: 101.8 (24 Aug 2023 05:40)  HR: 72 (24 Aug 2023 11:00) (56 - 116)  BP: 113/69 (24 Aug 2023 11:00) (95/70 - 131/62)  BP(mean): 85 (24 Aug 2023 11:00) (72 - 92)  RR: 17 (24 Aug 2023 11:00) (12 - 18)  SpO2: 100% (24 Aug 2023 11:00) (95% - 100%)    Parameters below as of 24 Aug 2023 11:00  Patient On (Oxygen Delivery Method): ventilator    O2 Concentration (%): 40  I&O's Detail    23 Aug 2023 07:01  -  24 Aug 2023 07:00  --------------------------------------------------------  IN:    IV PiggyBack: 450 mL    Lactated Ringers: 700 mL    Lactated Ringers Bolus: 1000 mL    Norepinephrine: 125.6 mL    sodium chloride 0.225%: 1000 mL    sodium chloride 0.45%: 1000 mL    sodium chloride 0.9%: 720 mL    Sodium Chloride 0.9% Bolus: 1000 mL  Total IN: 5995.6 mL    OUT:    Emesis (mL): 800 mL    External Ventricular Device (mL): 111 mL    Voided (mL): 8230 mL  Total OUT: 9141 mL    Total NET: -3145.4 mL      24 Aug 2023 07:01  -  24 Aug 2023 12:11  --------------------------------------------------------  IN:    dextrose 5%: 300 mL    dextrose 5%: 500 mL    Lactated Ringers: 100 mL  Total IN: 900 mL    OUT:    External Ventricular Device (mL): 37 mL    Indwelling Catheter - Urethral (mL): 1300 mL    Norepinephrine: 0 mL  Total OUT: 1337 mL    Total NET: -437 mL        ABG - ( 24 Aug 2023 01:21 )  pH, Arterial: 7.46  pH, Blood: x     /  pCO2: 39    /  pO2: 89    / HCO3: 28    / Base Excess: 3.7   /  SaO2: 98.8                                    14.3   14.00 )-----------( 172      ( 24 Aug 2023 08:43 )             42.2     08-24    165<HH>  |  130<H>  |  11  ----------------------------<  171<H>  3.3<L>   |  25  |  0.63    Ca    9.1      24 Aug 2023 08:38  Phos  1.2     08-24  Mg     2.4     08-24    TPro  7.4  /  Alb  4.2  /  TBili  0.6  /  DBili  x   /  AST  63<H>  /  ALT  93<H>  /  AlkPhos  57  08-24      Urinalysis with Rflx Culture (collected 08-24-23 @ 01:26)    Culture - Sputum (collected 08-24-23 @ 00:09)  Source: ET Tube ET Tube  Gram Stain (08-24-23 @ 04:37):    Moderate WBC's    Rare Gram positive cocci in pairs    Culture - CSF with Gram Stain (collected 08-23-23 @ 22:22)  Source: .CSF CSF  Gram Stain (08-24-23 @ 03:34):    Few WBC's    No organisms seen      MEDICATIONS  (STANDING):  atorvastatin 80 milliGRAM(s) Oral at bedtime  cefTRIAXone   IVPB 2000 milliGRAM(s) IV Intermittent every 12 hours  chlorhexidine 0.12% Liquid 15 milliLiter(s) Oral Mucosa every 12 hours  chlorhexidine 2% Cloths 1 Application(s) Topical <User Schedule>  dextrose 5%. 500 milliLiter(s) (500 mL/Hr) IV Continuous <Continuous>  dextrose 5%. 1000 milliLiter(s) (125 mL/Hr) IV Continuous <Continuous>  insulin lispro (ADMELOG) corrective regimen sliding scale   SubCutaneous every 6 hours  levETIRAcetam  IVPB 500 milliGRAM(s) IV Intermittent every 12 hours  niMODipine 60 milliGRAM(s) Oral every 4 hours  norepinephrine Infusion 0.05 MICROgram(s)/kG/Min (8.44 mL/Hr) IV Continuous <Continuous>  pantoprazole  Injectable 40 milliGRAM(s) IV Push two times a day  potassium phosphate IVPB 30 milliMole(s) IV Intermittent once  senna 2 Tablet(s) Oral at bedtime    MEDICATIONS  (PRN):      ***********************************************  ASSESSMENT AND PLAN  ***********************************************    #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 MCA territory stroke  #Intracranial hypertension due to SAH, stroke  #Acute respiratory failure due to catastrophic brain injury  #R. 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     NEURO  - Admit NSICU, Q1h neuro checks / Q1h vital signs / Q1h pupillometry / groin checks  - IRRAS, treat above 0, drain at 0, monitor output, color  - CTH in AM  - /500, nimotop 60q4, baseline TCD  - No sedation at this time as patient is comatose, will plan for RASS goal 0 to -1  - PT/OT if/when able    PULM  - AC/VC 18/410/40%/+5, pressure support as tolerated  - CXR in AM given suspicion for stress cardiomyopathy/ischemic cardiomyopathy AECHF, trend BNP  - SpO2 goal > 92%, supplemental O2 and pulm toileting as needed    CARDIO  - BP goal: SBP < 160  - Trend cardiac troponins, TTE  - Cardiology consultation    GI  - Diet: currently NPO  - KUB, discontinue LIWS if no ileus  - Stress ulcer prophylaxis: PPI BID given suspicion for GIB  - Stool count, bowel regimen    /RENAL  - Monitor UOP/volume status, BUN/SCr  - D5W at 125/h, will consider Q1H match  - DDAVP 0.5mcg as needed for UOP > 300cc/h x3d  - Mccormick catheter  - Euvolemia/eunatremia    HEME  - Maintain Hb > 7.0, PLT > 100,000 in setting of fresh bleed  - SCDs, holding VTE chemoppx    ID  - Start CTX CNS coverage, recheck CSF profile/PCR, ID consultation pending results  - Monitor for infectious s/s, fever curve, leukocytosis    ENDO  - SGlu < 200  - RASSI    08-23-23 @ 13:20       ***********************************************  ADULT NSICU PROGRESS NOTE  RAPHAEL CAMACHO [id number] [facility] [id number] [id number]  ***********************************************    INTERVAL: SNa spiked to 165 overnight, given 1L D5W and started on D5W continuous.  Contniuous nystagmus.    ROS: negative except per mentioned above in 24h interval events.      EXAM:     [place] Coma Scale: 1/1T/1    General: normocephalic, (+)EVD, laying in bed, unresponsive  Neuro     MS: [place] Coma Scale: 1/1T/1    CN: PERRL, gaze is conjugate, persistent nystagmus (bilateral), face symmetric, (+)corneals bilaterally, (+)cough    Mot: bulk normal, tone normal, no movement to noxious stimuli throughout  Chest: nonlabored respirations, no adventitious lung sounds bilaterally, heart regular rate/rhythm, present S1/S2, no murmurs or rubs  Abdomen: nondistended, soft and nontender without peritoneal signs, normoactive bowel sounds  Extremities: no clubbing, well-perfused, no edema    Vital Signs Last 24 Hrs  T(C): 37.3 (25 Aug 2023 09:10), Max: 38.3 (24 Aug 2023 22:00)  T(F): 99.1 (25 Aug 2023 09:10), Max: 100.9 (24 Aug 2023 22:00)  HR: 115 (25 Aug 2023 12:00) (75 - 129)  BP: --  BP(mean): --  RR: 17 (25 Aug 2023 12:00) (15 - 20)  SpO2: 100% (25 Aug 2023 12:00) (95% - 100%)    Parameters below as of 25 Aug 2023 12:00  Patient On (Oxygen Delivery Method): ventilator    O2 Concentration (%): 40  I&O's Detail    24 Aug 2023 07:01  -  25 Aug 2023 07:00  --------------------------------------------------------  IN:    dextrose 5%: 300 mL    dextrose 5%: 500 mL    dextrose 5%: 1000 mL    dextrose 5%: 1000 mL    dextrose 5%: 2175 mL    IV PiggyBack: 499.8 mL    Jevity 1.2: 80 mL    Lactated Ringers: 100 mL    Propofol: 143.1 mL    sodium chloride 0.45%: 100 mL  Total IN: 5897.9 mL    OUT:    External Ventricular Device (mL): 200 mL    Indwelling Catheter - Urethral (mL): 5660 mL    Norepinephrine: 0 mL  Total OUT: 5860 mL    Total NET: 37.9 mL      25 Aug 2023 07:01  -  25 Aug 2023 12:58  --------------------------------------------------------  IN:    dextrose 5%: 450 mL    Jevity 1.2: 100 mL    Propofol: 29.7 mL    sodium chloride 0.225%: 100 mL  Total IN: 679.7 mL    OUT:    External Ventricular Device (mL): 38 mL    Indwelling Catheter - Urethral (mL): 1925 mL  Total OUT: 1963 mL    Total NET: -1283.3 mL        ABG - ( 25 Aug 2023 06:34 )  pH, Arterial: 7.39  pH, Blood: x     /  pCO2: 40    /  pO2: 159   / HCO3: 24    / Base Excess: -0.7  /  SaO2: 100.0                                   13.2   15.30 )-----------( 150      ( 25 Aug 2023 06:19 )             40.4     08-25    166<HH>  |  135<H>  |  6<L>  ----------------------------<  334<H>  3.5   |  24  |  0.53    Ca    9.3      25 Aug 2023 09:33  Phos  2.0     08-25  Mg     2.4     08-25    TPro  6.5  /  Alb  3.4  /  TBili  0.5  /  DBili  x   /  AST  48<H>  /  ALT  64<H>  /  AlkPhos  56  08-24      Culture - Sputum (collected 08-24-23 @ 17:35)  Source: ET Tube ET Tube  Gram Stain (08-24-23 @ 18:13):    No epithelial cells seen    Few-moderate White blood cells    Rare Gram positive cocci in pairs    Culture - CSF with Gram Stain (collected 08-24-23 @ 14:28)  Source: .CSF CSF  Gram Stain (08-24-23 @ 15:21):    No organisms seen    Few White blood cells  Preliminary Report (08-25-23 @ 11:15):    Culture in progress      MEDICATIONS  (STANDING):  atorvastatin 80 milliGRAM(s) Oral at bedtime  cefTRIAXone   IVPB 2000 milliGRAM(s) IV Intermittent every 12 hours  chlorhexidine 0.12% Liquid 15 milliLiter(s) Oral Mucosa every 12 hours  chlorhexidine 2% Cloths 1 Application(s) Topical <User Schedule>  heparin   Injectable 5000 Unit(s) SubCutaneous every 8 hours  insulin lispro (ADMELOG) corrective regimen sliding scale   SubCutaneous every 6 hours  levETIRAcetam  IVPB 500 milliGRAM(s) IV Intermittent every 12 hours  niMODipine 60 milliGRAM(s) Oral every 4 hours  pantoprazole  Injectable 40 milliGRAM(s) IV Push two times a day  polyethylene glycol 3350 17 Gram(s) Oral two times a day  propofol Infusion 10 MICROgram(s)/kG/Min (5.4 mL/Hr) IV Continuous <Continuous>  senna 2 Tablet(s) Oral at bedtime  sodium chloride 0.225%. 1000 milliLiter(s) (100 mL/Hr) IV Continuous <Continuous>    MEDICATIONS  (PRN):  acetaminophen   Oral Liquid .. 650 milliGRAM(s) Oral every 6 hours PRN Temp greater or equal to 38C (100.4F), Mild Pain (1 - 3)  sodium chloride 0.9% lock flush 10 milliLiter(s) IV Push every 1 hour PRN Pre/post blood products, medications, blood draw, and to maintain line patency      ***********************************************  ASSESSMENT AND PLAN  ***********************************************    #HHV/MF4 giant 2.3x1.8cm L. PCOM aSAH, PBD#2  #S/p 15 coil embolization w/ incomplete occlusion via CFA access, POD#2  #L. MCA territory stroke  #Intracranial hypertension due to SAH, stroke  #Acute respiratory failure due to catastrophic brain injury  #R. 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 influenzae    NEURO  - Admit NSICU, Q1h neuro checks / Q1h vital signs / Q1h pupillometry / groin checks  - IRRAS, monitor output, color  - /500, nimotop 60q4, baseline TCD  - No sedation at this time as patient is comatose, will plan for RASS goal 0 to -1  - PT/OT if/when able    PULM  - AC/VC 18/410/40%/+5, pressure support as tolerated  - Trend BNP, daily EKGs  - SpO2 goal > 92%, supplemental O2 and pulm toileting as needed    CARDIO  - BP goal: SBP < 160  - Trend cardiac troponins, repeat limited TTE  - Cardiology consultation    GI  - Diet: TFs  - Stress ulcer prophylaxis: PPI BID given suspicion for GIB  - Stool count, bowel regimen    /RENAL  - Monitor UOP/volume status, BUN/SCr  - 1/4NS in water at 100cc/h  - DDAVP 0.5mcg as needed for UOP > 300cc/h x3d  - Euvolemia/eunatremia    HEME  - Maintain Hb > 7.0, PLT > 100,000 in setting of fresh bleed  - SCDs, start SQL  - Holding off on IVCF placement for now as patient is not stable    ID  - Start CTX CNS coverage, f/u cultures  - Monitor for infectious s/s, fever curve, leukocytosis    ENDO  - SGlu < 200  - RASSI

## 2023-08-26 NOTE — DIETITIAN INITIAL EVALUATION ADULT - PERTINENT LABORATORY DATA
08-26    162<HH>  |  132<H>  |  4<L>  ----------------------------<  202<H>  4.1   |  24  |  0.62    Ca    8.5      26 Aug 2023 11:16  Phos  3.9     08-26  Mg     2.0     08-26    TPro  6.0  /  Alb  3.0<L>  /  TBili  0.2  /  DBili  x   /  AST  27  /  ALT  49<H>  /  AlkPhos  68  08-26  POCT Blood Glucose.: 108 mg/dL (08-26-23 @ 16:11)  A1C with Estimated Average Glucose Result: 6.2 % (08-23-23 @ 12:26)

## 2023-08-26 NOTE — PROGRESS NOTE ADULT - ASSESSMENT
IMPRESSION:  53 year old female found unresponsive at home, found to have diffuse SAH / IVH w/ 2.7cm aneurysm, s/p coil/embolization & EVD. Course c/b fever 102 & persistent leukocytosis, with unclear etiology. Possibly 2/2 +H Flu vs HHSV6 seen on CSF PCR, vs neurogenic in the setting of SAH/stroke. Also possible aspiration in the setting of stroke. Started on empiric CTX 2g for H flu in CSF.   CSF studies not consistent w/ meningitis, repeat studies sent. ET Tube w/ MSSA as well.     Recommendations:   1.  Continue CTX 2g IV q12h to cover H Flu and & MSSA  2,   Follow up CSF culture, BCx, and ET culture    ID team 1 will follow. I will cover the service this weekend.  Dr. Barboza returns on 8/28/23

## 2023-08-26 NOTE — DIETITIAN INITIAL EVALUATION ADULT - NSFNSGIIOFT_GEN_A_CORE
08-25-23 @ 07:01  -  08-26-23 @ 07:00  --------------------------------------------------------  OUT:  Total OUT: 0 mL    Total NET: 480 mL      08-26-23 @ 07:01  -  08-26-23 @ 16:41  --------------------------------------------------------  OUT:  Total OUT: 0 mL    Total NET: 180 mL

## 2023-08-26 NOTE — PROVIDER CONTACT NOTE (CHANGE IN STATUS NOTIFICATION) - ASSESSMENT
During 8pm Neuro Check pt found to have fixed and dilated pupils, negative cough reflex, negative gag reflex, positive dolls eyes, no CSF output in EVD, ICPs normal

## 2023-08-26 NOTE — DIETITIAN INITIAL EVALUATION ADULT - OTHER CALCULATIONS
Ideal body weight used to calculate energy needs due to pt's current body weight >120% ideal body weight. Adjusted for critical illness, intubation   *defer fluids to team (hyperNa)

## 2023-08-26 NOTE — PROGRESS NOTE ADULT - SUBJECTIVE AND OBJECTIVE BOX
HPI:  53F with PMH HTN was BIBEMS to [facility] after being found unresponsive by her son this morning. Patient’s last known well was 7pm last night (8/22/2023). As per [facility] EM resident, the patient was obtunded and intubated upon arrival in the emergency department. Stroke code was called and CTH was ordered showing diffuse SAH with extensive IVH. 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 EVD was placed urgently at [facility], 1g of keppra given. Per EM resident, pt does not take blood thinners. VSS. [facility] ED Attending Dr. Mccurdy initiated transfer to [facility]. EMS initiated norepinephrine during transport to [facility]. HH5 MF4 NIHSS 31 (23 Aug 2023 13:11)    OVERNIGHT EVENTS: repeat  ->294, ->249, lantus 12u, ICPs still high 20s, additional 50g mannitol given 10PM, 500cc bolus d5w for euvolemia,   8/26: BD4, POD3 D5 bolus 1Lx2,D5 bolus 636apl3, d5w @250cc/hr, FW increased 400q4, , insulin gtt started for  -> 416->407->481, midodrine 5q8 started, 50mg mannitol given 2am for ICPs >20, 0.25mcg DDAVP given for persistenly elevated uop,     Hospital Course:   8/23: BD1 POD 0 s/p angio coil embolization of giant left pcomm aneurysm. IRRAflow EVD exchanged.   8/24: BD2 POD 1 angio coil embolization left pcomm aneurysm. O/n, UO increased, BMP/Urine studies monitored. Na 164, patient given stat dose of 0.25 mvg IV DDAVP, UO slowed. Spiked fever to 102, pancultured. @ 5 AM, ICP increased to 25-30 sustained, given 90 mg mannitol and taken for CTH. CTH demonstrates demarcated left frontoparietal hypodensity. Hematuria also noted, clearing. EVD lowered to 0.   Ceftriaxone started for empiric coverage PNA/fever. LR fluids disocntinued and D5 IVF started. ST depressions on tele, EKG and cardiac enzymes. Na goal 150-155.  LE dopplers with L popliteal and peroneal DVTs. Vascular consulted. Start SQH for DVT ppx. PICC placed Isidro. Start TF. ICPs 20s. Given fentanyl 75 mcg x1 and propofol drip started. Serum Na uptrensing 166, however urine output stable. Gastric occult send out.   8/25: BD 3 POD 2 angio coil embo left pcomm. Remains intubated, sedated with propofol. IRRAflow EVD @ 0. AM Na 165. D/c'd 1/2 NS. Started free water 300q4, D5 1L bolus and started at 150cc/hr. Na 166. Started 1/4 NS @ 100. D5 d/c'd. Mannitol 50g given for ICPs in the low 30s. Repeat CTH w/ evolving L MCA, smaller R AVELINO infarcts, mild worsening MLS. Na 167, given 1.5L bolus of D5 and started on maintenance. 1/4 NS d/c'd. Mannitol 50g given for ICPs in the low 30s, 50mcg fentanyl IVPx1 given. repeat  ->294, ->249, lantus 12u, ICPs still high 20s, additional 50g mannitol given 10PM, 500cc bolus d5w for euvolemia,   8/26: BD4, POD3 D5 bolus 1Lx2,D5 bolus [id number], d5w @250cc/hr, FW increased 400q4, , insulin gtt started for  -> 416->407->481, midodrine 5q8 started, 50mg mannitol given 2am for ICPs >20, 0.25mcg DDAVP given for persistenly elevated uop,     Vital Signs Last 24 Hrs  T(C): 38.2 (26 Aug 2023 02:00), Max: 39.2 (25 Aug 2023 14:08)  T(F): 100.7 (26 Aug 2023 02:00), Max: 102.6 (25 Aug 2023 14:08)  HR: 87 (26 Aug 2023 04:00) (81 - 117)  BP: --  BP(mean): --  RR: 18 (26 Aug 2023 03:00) (16 - 22)  SpO2: 100% (26 Aug 2023 04:00) (99% - 100%)    Parameters below as of 26 Aug 2023 04:00  Patient On (Oxygen Delivery Method): ventilator    O2 Concentration (%): 40    I&O's Detail    24 Aug 2023 07:01  -  25 Aug 2023 07:00  --------------------------------------------------------  IN:    dextrose 5%: 300 mL    dextrose 5%: 500 mL    dextrose 5%: 1000 mL    dextrose 5%: 1000 mL    dextrose 5%: 2175 mL    IV PiggyBack: 499.8 mL    Jevity 1.2: 80 mL    Lactated Ringers: 100 mL    Propofol: 143.1 mL    sodium chloride 0.45%: 100 mL  Total IN: 5897.9 mL    OUT:    External Ventricular Device (mL): 200 mL    Indwelling Catheter - Urethral (mL): 5660 mL    Norepinephrine: 0 mL  Total OUT: 5860 mL    Total NET: 37.9 mL      25 Aug 2023 07:01  -  26 Aug 2023 04:56  --------------------------------------------------------  IN:    dextrose 5%: 450 mL    dextrose 5%: 1000 mL    dextrose 5%: 1000 mL    dextrose 5%: 1900 mL    dextrose 5%: 1999 mL    Insulin: 19.2 mL    IV PiggyBack: 250 mL    IV PiggyBack: 100 mL    Jevity 1.2: 420 mL    Propofol: 72.9 mL    Propofol: 221.4 mL    sodium chloride 0.225%: 300 mL  Total IN: 7732.5 mL    OUT:    External Ventricular Device (mL): 220 mL    Indwelling Catheter - Urethral (mL): 8035 mL    Insulin: 0 mL  Total OUT: 8255 mL    Total NET: -522.5 mL        I&O's Summary    24 Aug 2023 07:01  -  25 Aug 2023 07:00  --------------------------------------------------------  IN: 5897.9 mL / OUT: 5860 mL / NET: 37.9 mL    25 Aug 2023 07:01  -  26 Aug 2023 04:56  --------------------------------------------------------  IN: 7732.5 mL / OUT: 8255 mL / NET: -522.5 mL        PHYSICAL EXAM:  Constitutional: Patient laying supine in bed, obtunded. GCS 3   Respiratory: intubated, diminished breath sounds throughout left lung  Cardiovascular: Regular, rate and rhythm   Gastrointestinal:  Soft, nontender, nondistended  Vascular: Extremities warm, no ulcers, no discoloration of skin, DP 2+ b/l/PT 1+b/l  Neurological: Not opening eyes to voice or noxious stimuli, not FC  CNII-XII: left pupil 2.49mm and, right 1.87mm both briskly reactive (seen on pupilometer), corneal reflex present bilaterally, gag reflex intact,   Motor: Not moving all 4 extremities to noxious stimuli  + right frontal IRRAflow EVD     Incision/Wound:  Staples in place, no purulent drainage, no erythema or edema     DEVICE/DRAIN DRESSING:  Dressing c/d/i    TUBES/LINES:  [] CVC  [] A-line  [] Lumbar Drain  [] Ventriculostomy  [] KATHERINE  [] Mccormick  [] NGT   [] Other    DIET:  [] NPO  [] Mechanical  [] Tube feeds    LABS:                        10.9   10.56 )-----------( 97       ( 26 Aug 2023 03:59 )             34.0     08-26    159<H>  |  129<H>  |  6<L>  ----------------------------<  578<HH>  3.1<L>   |  20<L>  |  0.74    Ca    8.5      26 Aug 2023 03:59  Phos  2.4     08-26  Mg     2.0     08-26    TPro  6.0  /  Alb  3.0<L>  /  TBili  0.2  /  DBili  x   /  AST  27  /  ALT  49<H>  /  AlkPhos  68  08-26      Urinalysis Basic - ( 26 Aug 2023 03:59 )    Color: x / Appearance: x / SG: x / pH: x  Gluc: 578 mg/dL / Ketone: x  / Bili: x / Urobili: x   Blood: x / Protein: x / Nitrite: x   Leuk Esterase: x / RBC: x / WBC x   Sq Epi: x / Non Sq Epi: x / Bacteria: x      CARDIAC MARKERS ( 24 Aug 2023 11:23 )  x     / x     / 615 U/L / x     / 13.7 ng/mL  CARDIAC MARKERS ( 24 Aug 2023 08:38 )  x     / x     / 663 U/L / x     / 14.7 ng/mL      CAPILLARY BLOOD GLUCOSE      POCT Blood Glucose.: 481 mg/dL (26 Aug 2023 04:00)  POCT Blood Glucose.: 407 mg/dL (26 Aug 2023 03:05)  POCT Blood Glucose.: 416 mg/dL (26 Aug 2023 01:38)  POCT Blood Glucose.: 249 mg/dL (25 Aug 2023 23:10)  POCT Blood Glucose.: 294 mg/dL (25 Aug 2023 20:36)  POCT Blood Glucose.: 313 mg/dL (25 Aug 2023 19:11)  POCT Blood Glucose.: 321 mg/dL (25 Aug 2023 17:28)  POCT Blood Glucose.: 138 mg/dL (25 Aug 2023 14:28)  POCT Blood Glucose.: 213 mg/dL (25 Aug 2023 11:48)  POCT Blood Glucose.: 143 mg/dL (25 Aug 2023 05:54)      Drug Levels: [] N/A    CSF Analysis: [] N/A      Allergies    No Known Allergies    Intolerances        Home Medications:      MEDICATIONS:  Antibiotics:  cefTRIAXone   IVPB 2000 milliGRAM(s) IV Intermittent every 12 hours    Neuro:  acetaminophen   Oral Liquid .. 650 milliGRAM(s) Oral every 6 hours PRN  fentaNYL    Injectable 50 MICROGram(s) IV Push every 2 hours PRN  levETIRAcetam  IVPB 500 milliGRAM(s) IV Intermittent every 12 hours  propofol Infusion 10 MICROgram(s)/kG/Min IV Continuous <Continuous>    Anticoagulation:  heparin   Injectable 5000 Unit(s) SubCutaneous every 8 hours    OTHER:  atorvastatin 80 milliGRAM(s) Oral at bedtime  chlorhexidine 0.12% Liquid 15 milliLiter(s) Oral Mucosa every 12 hours  chlorhexidine 2% Cloths 1 Application(s) Topical <User Schedule>  desmopressin Injectable 0.25 MICROGram(s) IV Push once  dextrose 50% Injectable 50 milliLiter(s) IV Push every 15 minutes  insulin glargine Injectable (LANTUS) 16 Unit(s) SubCutaneous at bedtime  insulin lispro (ADMELOG) corrective regimen sliding scale   SubCutaneous every 6 hours  insulin regular Infusion 8 Unit(s)/Hr IV Continuous <Continuous>  niMODipine 60 milliGRAM(s) Oral every 4 hours  pantoprazole  Injectable 40 milliGRAM(s) IV Push two times a day  polyethylene glycol 3350 17 Gram(s) Oral two times a day  senna 2 Tablet(s) Oral at bedtime    IVF:  dextrose 5%. 1000 milliLiter(s) IV Continuous <Continuous>    CULTURES:  Culture Results:   Few-moderate Staphylococcus aureus Presumptive Methicillin susceptible  Confirmation to follow within 24 hrs.  Susceptibility to follow. (08-24 @ 17:35)  Culture Results:   Culture in progress (08-24 @ 14:28)    RADIOLOGY & ADDITIONAL TESTS:      ASSESSMENT:  53F with PMH HTN was BIBEMS to [facility] after being found unresponsive by her son this morning. CTH with diffuse SAH with extensive IVH. 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 EVD was placed urgently at [facility], Transferred to [facility] for further intervention HH5 MF4 NIHSS 31 GCS 3. 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     PLAN:  Neuro:  - Neuro checks/vitals q1h   - Continue keppra for seizure prophylaxis  - IRRAflow EVD open @ 0 cmH2O, monitor ICPs and output  - CTH 8/24: new large left frontoparietal and small right paramedian regions of hypodensity which may reflect edema and/or ischemia. Slight enlargement of the bilateral frontal and right temporal horns, 8/25: Evolving left MCA and smaller right AVELINO territory acute-subacute infarcts, mild worsening MLS Large IVH and SAH grossly unchanged   - Nimodipine 60 mg q4h   - veeg 8/25, target burst suppression  - Propofol for sedation   - Stroke core measures   - mannitol prn target gap <20    Cardiac:  - -150  - Lipitor started  - Cardiogloy following, NSTEMI: ST depressions on EKG, Qtc 605 8/25  - Continue trending troponin, downtrending   - Suboptimal echo 8/24, repeat TTE 8/25 EF 70-75%  - midodrine 5q8hr    Pulm:   - + Intubation, VC AC 40/410/12/5    GI:  - + OGT, TF restarted trickle feeds   - Bowel regimen, pending BM  - PPI BID - coffee ground output from OGT, resolved    Renal:   - Mccormick placed at OSH, exchanged on 8/23, hematuria resolved  - D5 @250cc/hr maintanence titrated to match prior hour UOP, FW 400cc q4h  - Na goal 150-155  - Strict I's and O's, euvolemia  - Elevated lactate, trend     Endo:  - ISS   - insulin gtt  - 16u lantus  - a1c 6.2  - DI: s/p DDAVP 8/23    ID:  - ID following, reccomend CTX 2g BID (8/24 - )  - CSF HHV6, hemophilus; repeat CSF cx HHV6  - Sputum cx 8/24 +GPC likely MSSA    Heme:  - SCD right leg only in place, SQH ppx   - LE dopplers 8/24 with left popliteal and peroneal DVT vasc surg c/s - IVCF when stable  - DIC w/u     Dispo:   - ICU status, full code, dispo pending  - Palliative following for GOC   - [facility] following     D/w Dr. Wooten, Dr. Agrawal and Dr. Silver     DVT PROPHYLAXIS:  [x] Venodynes                                [x] Heparin/Lovenox    FALL RISK:  [] Low Risk                                    [] Impulsive      Assessment:  Present when checked    []  GCS  E   V  M     Heart Failure: []Acute, [] acute on chronic , []chronic  Heart Failure:  [] Diastolic (HFpEF), [] Systolic (HFrEF), []Combined (HFpEF and HFrEF), [] RHF, [] Pulm HTN, [] Other    [] BHAVIN, [] ATN, [] AIN, [] other  [] CKD1, [] CKD2, [] CKD 3, [] CKD 4, [] CKD 5, []ESRD    Encephalopathy: [] Metabolic, [] Hepatic, [] toxic, [] Neurological, [] Other    Abnormal Nurtitional Status: [] malnurtition (see nutrition note), [ ]underweight: BMI < 19, [] morbid obesity: BMI >40, [] Cachexia    [] Sepsis  [] hypovolemic shock,[] cardiogenic shock, [] hemorrhagic shock, [] neuogenic shock  [] Acute Respiratory Failure  []Cerebral edema, [] Brain compression/ herniation,   [] Functional quadriplegia  [] Acute blood loss anemia

## 2023-08-26 NOTE — DIETITIAN INITIAL EVALUATION ADULT - OTHER INFO
53F with PMH HTN was BIBEMS to [facility] after being found unresponsive by her son this morning. CTH with diffuse SAH with extensive IVH. 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 EVD was placed urgently at [facility], Transferred to [facility] for further intervention HH5 MF4 NIHSS 31 GCS 3. 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      Visited pt at bedside this afternoon on 8EA. On assessment pt is intubated, sedated on propofol @27ml/hr (provides 713Kcal/day from lipids). Unable to obtain diet/wt hx at this time given intubation. RD to re-attempt at f/u as able. Defer subjective assessment to EMR. EN regimen ordered. On assessment TF of Glucerna 1.2 running @20ml/hr- now advanced to 35ml/hr. Recommend changing TF formula to Vital HP to better meet pt's estimated protein needs given critical illness. Please view TF recs below. No overt fat/muscle wasting noted. Skin with surgical incision to R head and R groin; no PUs documented; no edema noted; keshav scale 12. No nvdc documented. Bowel regimen ordered. Nutrition related labs reviewed; FSBG 108, 134, 170- insulin grtt in place; high Na (162)- defer fluids to team. Non verbal indicators of pain at time of assessment. Please view full recs below. RD to remain available for recs adjustment prn or will follow up pt per organizational policy

## 2023-08-26 NOTE — DIETITIAN INITIAL EVALUATION ADULT - ADD RECOMMEND
1. Recommend changing EN regimen to Vital HP @41ml/hr x 24hrs. Considering Kcal from propofol, TF provides 1697Kcal, 86g protein, 823ml free water in 984ml total TF volume.  > Recommend adding LPS 1x/day (100Kcal, 15g protein) -> to provide 1797Kcal, 101g protein   > maintain aspiration precautions; monitor tolerance to TF closely   > Adjust fluids per team (hyperNa)  2. Monitor GI fxn, skin integrity, labs, wts   3. Monitor propofol/pressors; adjust TF prn   4. RD to re-attempt full nutrition assessment at f/u as able   5. RD to remain available for recs adjustment prn or will follow up pt per organizational policy

## 2023-08-26 NOTE — DIETITIAN INITIAL EVALUATION ADULT - PERTINENT MEDS FT
MEDICATIONS  (STANDING):  atorvastatin 80 milliGRAM(s) Oral at bedtime  cefTRIAXone   IVPB 2000 milliGRAM(s) IV Intermittent every 12 hours  chlorhexidine 0.12% Liquid 15 milliLiter(s) Oral Mucosa every 12 hours  chlorhexidine 2% Cloths 1 Application(s) Topical <User Schedule>  dextrose 5%. 1000 milliLiter(s) (200 mL/Hr) IV Continuous <Continuous>  dextrose 50% Injectable 50 milliLiter(s) IV Push every 15 minutes  heparin   Injectable 5000 Unit(s) SubCutaneous every 8 hours  insulin glargine Injectable (LANTUS) 16 Unit(s) SubCutaneous at bedtime  insulin regular Infusion 14 Unit(s)/Hr (14 mL/Hr) IV Continuous <Continuous>  levETIRAcetam  IVPB 500 milliGRAM(s) IV Intermittent every 12 hours  niMODipine 60 milliGRAM(s) Oral every 4 hours  pantoprazole  Injectable 40 milliGRAM(s) IV Push two times a day  polyethylene glycol 3350 17 Gram(s) Oral two times a day  propofol Infusion 10 MICROgram(s)/kG/Min (5.4 mL/Hr) IV Continuous <Continuous>  senna 2 Tablet(s) Oral two times a day    MEDICATIONS  (PRN):  acetaminophen   Oral Liquid .. 650 milliGRAM(s) Oral every 6 hours PRN Temp greater or equal to 38C (100.4F), Mild Pain (1 - 3)  fentaNYL    Injectable 50 MICROGram(s) IV Push every 2 hours PRN vent dysynchrony  sodium chloride 0.9% lock flush 10 milliLiter(s) IV Push every 1 hour PRN Pre/post blood products, medications, blood draw, and to maintain line patency

## 2023-08-26 NOTE — PROGRESS NOTE ADULT - SUBJECTIVE AND OBJECTIVE BOX
NSCU ATTENDING -- ADDITIONAL PROGRESS NOTE    Nighttime rounds were performed     HPI:  54 y/o F with PMH HTN was brought in by EMS to [facility] after being found unresponsive by her son this morning. Patient’s last known well was 7pm last night (8/22/2023). As per [facility] EM resident, the patient was obtunded and intubated upon arrival in the emergency department. Stroke code was called and CTH was ordered showing diffuse SAH with extensive IVH. 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 EVD was placed urgently at [facility], 1g of Keppra given. Per EM resident, pt does not take blood thinners. VSS. [facility] ED Attending Dr. Mccurdy initiated transfer to [facility]. EMS initiated norepinephrine during transport to [facility]. HH5 MF4 NIHSS 31 (23 Aug 2023 13:11)    On admission, the patient was:  GCS: 3  Hunt-Stewart: 5  modified Rudd: 4  ICH score:  NIHSS:    *** HIGH RISK OF DVT PRESENT ON ADMISSION ***      ICU Vital Signs Last 24 Hrs  T(C): 36.5 (26 Aug 2023 21:40), Max: 38.2 (26 Aug 2023 02:00)  T(F): 97.7 (26 Aug 2023 21:40), Max: 100.7 (26 Aug 2023 02:00)  HR: 91 (26 Aug 2023 22:00) (65 - 128)  BP: 101/58 (26 Aug 2023 21:00) (92/56 - 105/64)  BP(mean): 75 (26 Aug 2023 21:00) (69 - 79)  ABP: 105/63 (26 Aug 2023 22:00) (93/59 - 152/78)  ABP(mean): 81 (26 Aug 2023 22:00) (73 - 108)  RR: 12 (26 Aug 2023 22:00) (12 - 20)  SpO2: 100% (26 Aug 2023 22:00) (99% - 100%)      08-25-23 @ 07:01  -  08-26-23 @ 07:00  --------------------------------------------------------  IN: 8287.3 mL / OUT: 9581 mL / NET: -1293.7 mL    08-26-23 @ 07:01  -  08-26-23 @ 22:32  --------------------------------------------------------  IN: 5080.2 mL / OUT: 1915 mL / NET: 3165.2 mL      Mode: AC/ CMV (Assist Control/ Continuous Mandatory Ventilation), RR (machine): 12, TV (machine): 410, FiO2: 40, PEEP: 5, ITime: 1, MAP: 9.6, PIP: 17      EXAM:   Agustín Coma Scale: 3  General: Normocephalic, (+) EVD, laying in bed, unresponsive  HEENT:   Neuro:    CN: Pupils 5mm and fixed, no dolls, absent cough/gag, not overbreathing vent set rate    Mot: Uppers 0/5, lower 0/5  Chest: Diminished B/L  Heart: Regular rate/rhythm, S1/S2  Abdomen: Soft, nontender, +BS  Extremities: No edema      MEDICATIONS:   acetaminophen   Oral Liquid .. 650 milliGRAM(s) Oral every 6 hours PRN  atorvastatin 80 milliGRAM(s) Oral at bedtime  cefTRIAXone   IVPB 2000 milliGRAM(s) IV Intermittent every 12 hours  chlorhexidine 0.12% Liquid 15 milliLiter(s) Oral Mucosa every 12 hours  chlorhexidine 2% Cloths 1 Application(s) Topical <User Schedule>  dextrose 5%. 1000 milliLiter(s) (150 mL/Hr) IV Continuous <Continuous>  dextrose 50% Injectable 50 milliLiter(s) IV Push every 15 minutes  fentaNYL    Injectable 50 MICROGram(s) IV Push every 2 hours PRN  heparin   Injectable 5000 Unit(s) SubCutaneous every 8 hours  insulin NPH human recombinant 25 Unit(s) SubCutaneous every 6 hours  insulin regular Infusion 14 Unit(s)/Hr (14 mL/Hr) IV Continuous <Continuous>  levETIRAcetam  IVPB 500 milliGRAM(s) IV Intermittent every 12 hours  niMODipine 60 milliGRAM(s) Oral every 4 hours  pantoprazole  Injectable 40 milliGRAM(s) IV Push two times a day  phenylephrine    Infusion 0.1 MICROgram(s)/kG/Min (3.38 mL/Hr) IV Continuous <Continuous>  polyethylene glycol 3350 17 Gram(s) Oral two times a day  propofol Infusion 10 MICROgram(s)/kG/Min (5.4 mL/Hr) IV Continuous <Continuous>  senna 2 Tablet(s) Oral two times a day  sodium chloride 0.9% lock flush 10 milliLiter(s) IV Push every 1 hour PRN      LABS:                     9.9    8.64  )-----------( 75       ( 26 Aug 2023 19:12 )             30.7     08-26    160<HH>  |  128<H>  |  4<L>  ----------------------------<  144<H>  4.8   |  25  |  0.80    Ca    7.8<L>      26 Aug 2023 19:12  Phos  4.8     08-26  Mg     1.8     08-26    TPro  6.0  /  Alb  3.0<L>  /  TBili  0.2  /  DBili  x   /  AST  27  /  ALT  49<H>  /  AlkPhos  68  08-26    LIVER FUNCTIONS - ( 26 Aug 2023 03:59 )  Alb: 3.0 g/dL / Pro: 6.0 g/dL / ALK PHOS: 68 U/L / ALT: 49 U/L / AST: 27 U/L / GGT: x           ABG - ( 25 Aug 2023 06:34 )  pH, Arterial: 7.39  pH, Blood: x     /  pCO2: 40    /  pO2: 159   / HCO3: 24    / Base Excess: -0.7  /  SaO2: 100.0         Culture - CSF with Gram Stain (collected 08-24-23 @ 14:28)  Source: .CSF CSF  Gram Stain (08-24-23 @ 15:21):    No organisms seen    Few White blood cells    Culture - Blood (collected 08-24-23 @ 02:42)  Source: .Blood Blood-Peripheral  Preliminary Report (08-24-23 @ 16:00):    No growth at 12 hours    Culture - Blood (collected 08-24-23 @ 02:42)  Source: .Blood Blood-Peripheral  Preliminary Report (08-24-23 @ 16:00):    No growth at 12 hours        PROCEDURE  There is a giant aneurysm of left PComm measuring 2.3cm x 1.8cm.   It was microcatheterized and coil embolized successfully.       ASSESSMENT:  HH5 MF4 L. PCOMM giant aneurysm with SAH BD 4  Acute respiratory failure due above  LMCA territory acute stroke  Intracranial HTN  PNA  NSTEMI  Hypernatremia likely DI  DVT L popiteal and peroneal  HHV 6 positive  History of HTN      PLAN:  NEURO:  Q1h neurochecks/ Q1h pupillometry  IRRAflow EVD at 0cmH2O. No drainage as suspected brain death  Sedation: None- DCed for brain death optimization. Allow 4-5 half lives  High grade SAH: On EEG. NO seizures. Appears to be completely suppressed. Epilepsy to comment  DC EEG   Bedrest   Palliative care for family support  [facility]    PULM:  Continue full vent support  Baseline CXR, ABG   VAP bundle    CARDIAC:   SBP > 100 for brain death testing  Pressor support as needed  TTE with definity; EF 70-75%  Trend trops and monitor EKG daily  Continue statin  No antiplts for now given acute bleed  Cardiology following    GI:   Diet: TF via OG- glucerna  NPO for apnea testing at 6:00am  Sucralfate as plts low  LBM: Pending  Monitor LFTs 8/26  Bowel regimen    /RENAL: Significant autodiuresis; concern for DI s/p DDAVP.   Keep Mccormick for now  Replete lytes aggressively  Monitor UOP/volume status, BUN/Cr. Euvolemia/eunatremia  Continue D5W at 150ml/hr. Na goal 140-150. Continue free water 400ml Q4  BMPs Q4.     HEME: SAH, Acute deep venous thrombosis: below the knee, thrombocytopenia  Acute DVT of the LEFT popliteal and peroneal veins.   Vasc surgery consulted  Maintain Hb > 7.0, PLT > 80. If plt continue to decrease, DC heparin.   SCDs, heparin ppx  4T score  Low pobability of HIT (<5%)    ID: Leukocytosis, fevers. PNA MSSA  Blood NGTD  UA neg. MSSA Sputum staph procalcitonin: 0.7  CXR -clear. CSF NGTD  ID following  On Ceftriaxone    ENDO: DI, iatrogenic hyperglycemia  Glucose goal 140- 180mg/DL  A1c: 6.2  TSH: 0.9  LDL: 164  Insulin gtt-> NPH 25u Q6, ISS  ISS. Q1 fingersticks until stable 140- 180mg/dL    MISC:  PICC line    SOCIAL/FAMILY:  [x] awaiting [] updated at bedside [] family meeting    CODE STATUS:  [x] Full Code [] DNR [] DNI [] Palliative/Comfort Care    DISPOSITION:  [x] ICU [] Stroke Unit [] Floor [] EMU [] RCU [] PCU    Time spent: 45 critical care minutes

## 2023-08-26 NOTE — PROGRESS NOTE ADULT - SUBJECTIVE AND OBJECTIVE BOX
INTERVAL HPI/OVERNIGHT EVENTS:    Coverage for Dr. Barboza (Team 1)    Patient was seen and examined at bedside.  + Fever last night.  Remains intubated    ROS:    unable to obtain         ANTIBIOTICS/RELEVANT:    MEDICATIONS  (STANDING):  atorvastatin 80 milliGRAM(s) Oral at bedtime  cefTRIAXone   IVPB 2000 milliGRAM(s) IV Intermittent every 12 hours  chlorhexidine 0.12% Liquid 15 milliLiter(s) Oral Mucosa every 12 hours  chlorhexidine 2% Cloths 1 Application(s) Topical <User Schedule>  dextrose 5%. 1000 milliLiter(s) (200 mL/Hr) IV Continuous <Continuous>  dextrose 50% Injectable 50 milliLiter(s) IV Push every 15 minutes  heparin   Injectable 5000 Unit(s) SubCutaneous every 8 hours  insulin glargine Injectable (LANTUS) 16 Unit(s) SubCutaneous at bedtime  insulin regular Infusion 14 Unit(s)/Hr (14 mL/Hr) IV Continuous <Continuous>  levETIRAcetam  IVPB 500 milliGRAM(s) IV Intermittent every 12 hours  niMODipine 60 milliGRAM(s) Oral every 4 hours  pantoprazole  Injectable 40 milliGRAM(s) IV Push two times a day  polyethylene glycol 3350 17 Gram(s) Oral two times a day  potassium chloride  20 mEq/100 mL IVPB 20 milliEquivalent(s) IV Intermittent every 2 hours  propofol Infusion 10 MICROgram(s)/kG/Min (5.4 mL/Hr) IV Continuous <Continuous>  senna 2 Tablet(s) Oral two times a day    MEDICATIONS  (PRN):  acetaminophen   Oral Liquid .. 650 milliGRAM(s) Oral every 6 hours PRN Temp greater or equal to 38C (100.4F), Mild Pain (1 - 3)  fentaNYL    Injectable 50 MICROGram(s) IV Push every 2 hours PRN vent dysynchrony  sodium chloride 0.9% lock flush 10 milliLiter(s) IV Push every 1 hour PRN Pre/post blood products, medications, blood draw, and to maintain line patency        Vital Signs Last 24 Hrs  T(C): 35.2 (26 Aug 2023 10:19), Max: 39.2 (25 Aug 2023 14:08)  T(F): 95.4 (26 Aug 2023 10:19), Max: 102.6 (25 Aug 2023 14:08)  HR: 83 (26 Aug 2023 12:00) (65 - 117)  BP: 92/56 (26 Aug 2023 08:00) (92/56 - 105/64)  BP(mean): 69 (26 Aug 2023 08:00) (69 - 79)  RR: 18 (26 Aug 2023 12:00) (16 - 22)  SpO2: 100% (26 Aug 2023 12:00) (100% - 100%)    Parameters below as of 26 Aug 2023 12:00  Patient On (Oxygen Delivery Method): ventilator    O2 Concentration (%): 40    PHYSICAL EXAM:  Constitutional: ill appearing, intubated  Eyes:GABRIELA, EOMI  Ear/Nose/Throat: no oral lesion, no sinus tenderness on percussion	  Neck:  supple  Respiratory: CTA luciano  Cardiovascular: S1S2 RRR, no murmurs  Gastrointestinal:soft, (+) BS, no HSM  Extremities:no edema  Vascular: DP Pulse:	right normal; left normal      LABS:                        10.9   10.56 )-----------( 97       ( 26 Aug 2023 03:59 )             34.0     08-26    x   |  x   |  x   ----------------------------<  202<H>  x    |  24  |  0.62    Ca    8.5      26 Aug 2023 11:16  Phos  3.9     08-26  Mg     2.0     08-26    TPro  6.0  /  Alb  3.0<L>  /  TBili  0.2  /  DBili  x   /  AST  27  /  ALT  49<H>  /  AlkPhos  68  08-26      Urinalysis Basic - ( 26 Aug 2023 11:16 )    Color: x / Appearance: x / SG: x / pH: x  Gluc: 202 mg/dL / Ketone: x  / Bili: x / Urobili: x   Blood: x / Protein: x / Nitrite: x   Leuk Esterase: x / RBC: x / WBC x   Sq Epi: x / Non Sq Epi: x / Bacteria: x        MICROBIOLOGY:    Culture - Sputum . (08.24.23 @ 17:35)    -  Clindamycin: S 0.5   -  Trimethoprim/Sulfamethoxazole: S <=0.5/9.5   -  Vancomycin: S 2   Gram Stain:   No epithelial cells seen  Few-moderate White blood cells  Rare Gram positive cocci in pairs   -  Erythromycin: S <=0.25   -  Linezolid: S 4   -  Oxacillin: S <=0.25 Oxacillin predicts susceptibility for dicloxacillin, methicillin, and nafcillin   -  Rifampin: S <=1 Should not be used as monotherapy   Specimen Source: ET Tube ET Tube   Culture Results:   Few-moderate Staphylococcus aureus  Culture - CSF with Gram Stain . (08.24.23 @ 14:28)    Gram Stain:   No organisms seen  Few White blood cells   Specimen Source: .CSF CSF   Culture Results:   Culture in progress    Accompanied by normal respiratory shmuel   Organism Identification: Staphylococcus aureus   Organism: Staphylococcus aureus   Method Type: YANICK        RADIOLOGY & ADDITIONAL STUDIES:

## 2023-08-26 NOTE — EEG REPORT - NS EEG TEXT BOX
[facility] Department of Neurology  Inpatient Continuous video-Electroencephalogram    Patient Name:	RAPHAEL CAMACHO    :	1970  MRN:	[id number]    Study Start Date/Time:  2023, 12:00:03 PM  Study End Date/Time:    Referred by: Dr Wooten    Brief clinical history:   53 y old woman with known hypertension. Now with acute SAH with extensive IVH, in the setting of left posterior communicating artery aneurysm. Status post  angio coil embolization.    Diagnosis Code:   R56.9 convulsions/seizure    Current anticonvulsants:  Levetiracetam      Acquisition details:  Electroencephalography was acquired using a minimum of 21 channels on an [employer] Neu[employer] system v 9.3.1 with electrode placement according to the standard International 10-20 system following ACNS (American Clinical Neurophysiology Society) guidelines for Long-Term Video EEG monitoring.  Anterior temporal T1 and T2 electrodes were utilized whenever possible.   The [employer]TEK automated spike & seizure detections were all reviewed in detail, in addition to extensive portions of raw EEG.    Day 1  From 2023 at 12:00:03 PM to 2023 at 07:00:00 AM  The awake state was not achieved by the patient.  The background was characterized y the presence of a poorly organized discontinuous mixture of mainly delta and theta frequencies.  Overt burst suppression patterns were noted after 04:00:00 on 2023. Suppression periods were 2-3 seconds long in duration. Burst periods were characterized by the presence of left greater than right hemispheric synchronous and asynchronous periodic lateralizing epileptiform discharges (PLEDs).  No clinical events of concern occurred.  No electrographic or electroclinical seizures occurred.    Day 1 Impression:  Abnormal tracing due to the presence of:  1)	Burst suppression patterns  2)	Abundant periodic lateralizing epileptiform discharges, left greater than right hemispheres, synchronous and asynchronous (PLEDs)    Day 1 clinical correlation:  Abnormal tracing.  The above mentioned findings are consistent with the presence of severe encephalopathic state with left greater than right potential epileptogenicity.  No clinical events occurred.  No electrographic or electroclinical seizures occurred.    The undersigned attending physicians have reviewed portions of this record on the dates documented below:  On 2023 the study was reviewed from 2023 at 12:00:13 PM to 2023 at 07:00:00 AM by Anny Redmond MD

## 2023-08-26 NOTE — DIETITIAN INITIAL EVALUATION ADULT - NS FNS DIET ORDER
Diet, NPO with Tube Feed:   Tube Feeding Modality: Orogastric  Glucerna 1.2 Joselito (GLUCERNARTH)  Total Volume for 24 Hours (mL): 840  Total Number of Cans: 4  Continuous  Starting Tube Feed Rate {mL per Hour}: 10  Increase Tube Feed Rate by (mL): 10     Every 4 hours  Until Goal Tube Feed Rate (mL per Hour): 35  Tube Feed Duration (in Hours): 24  Tube Feed Start Time: 16:00 (08-26-23 @ 16:13)

## 2023-08-27 NOTE — CHART NOTE - NSCHARTNOTEFT_GEN_A_CORE
1L D5 bolus given. TF discontinued. Patient declared brain dead by neurointensivist Dr. Marquis at 2:01pm and family at bedside including son Tomi notified. Pt was deemed not an ME case, case# [id number].
BD4, POD3 D5 bolus 1Lx2,D5 bolus [id number], d5w @250cc/hr, FW increased 400q4, , insulin gtt started for  -> 416->407->481, midodrine 5q8 started, 50mg mannitol given 2am for ICPs >20, 0.25mcg DDAVP given for persistenly elevated uop. D5 increased 200cc/hr. Advanced TF.
Neurologic assessment was performed, and pupils
Neurological assessment was performed at bedside. Patient's pupils shown to be non-reactive BL, despite use of pupilometer. Otherwise, Pt A&O x 0, doesn't open eyes to noxious stimulus, extremities are 0/5 to noxious. vEEG showed no activity. Patient remains full code status at this time.
Notified by RN that patient's pupils are dilated and fixed.    Patient seen immediately. Propofol promptly discontinued (previously on to target burst suppression in the setting of intracranial HTN)  On exam, pupils 5mm and unreactive B/L with pupillometer.   Absent corneal reflexes, negative doll's eyes reflex, absent cough/gag. Not overbreathing the vent set rate, flaccid in all 4 extremities.    EEG noted to be severely suppressed.  No longer with output from EVD.   Strongly suspect herniation with progression to brain death.     At this time, remain off propofol.  Optimize for brain death testing. Monitor hemodynamics/ electrolytes.   Primary neurosurgeon notified.
POD 2 angio coil embo left pcomm. Remains intubated, sedated with propofol. IRRAflow EVD @ 0. AM Na 165. D/c'd 1/2 NS. Started free water 300q4, D5 1L bolus and started at 150cc/hr. Na 166. Started 1/4 NS @ 100. D5 d/c'd. Mannitol 50g given for ICPs in the low 30s. Na 167, given 1.5L bolus of D5 and started on maintenance. 1/4 NS d/c'd. Mannitol 50g given for ICPs in the low 30s.

## 2023-08-27 NOTE — PROVIDER CONTACT NOTE (OTHER) - REASON
Elevated ICP
ICP >20
ICP 31-34
Increasing ICP and Temp
Pt currently on insulin drip, sugars have been less than 150 for 5 hours
EVD output
PT  after 4 units of lispro insulin

## 2023-08-27 NOTE — PROVIDER CONTACT NOTE (OTHER) - ASSESSMENT
Finger sticks checked after 4 units of lispro given with not much change in sugar
Neuro status at baseline. EVD changed from irrigation mode to drain mode at 4am.
No change in exam, insuline drip being titrated per protocol yet need to assess patient's needs for insuline drip as sugars have been WDL for multiple hours. VSS
/71(91),   hr 101ST, RR 22 on VC AC Fio2 40%, , RR12, Temp 102.8 Cooling blanket intiated
vitals stable (see flow sheet)
bp   HR87 sinus, RR 22 on VC AC fio2 40, , RR12, peep 5, no distress noted.  temp 102.6, on cooling blanket

## 2023-08-27 NOTE — PROVIDER CONTACT NOTE (OTHER) - NAME OF MD/NP/PA/DO NOTIFIED:
Dr. Marquis/Neuro PA Cathy
Dr. Marquis/ Neuro team
MELISSA Dejesus and MD Wylie
Dr. Wylie
MD Benitez Brewer
Lupe Camargo neuro PA
MELISSA Dejesus

## 2023-08-27 NOTE — CHART NOTE - NSCHARTNOTESELECT_GEN_ALL_CORE
Exam change/Event Note
Exam change/Event Note
NSGY/Event Note
Exam Change/Event Note
NSGY/Event Note
Progress Note/Event Note

## 2023-08-27 NOTE — PROGRESS NOTE ADULT - SUBJECTIVE AND OBJECTIVE BOX
***********************************************  ADULT NSICU PROGRESS NOTE  RAPHAEL CAMACHO [id number] [facility] 08EA [id number]  ***********************************************    INTERVAL:       ROS: negative except per mentioned above in 24h interval events.      EXAM:     Coleman Coma Scale: 1/1T/1    General: normocephalic, (+)EVD, laying in bed, unresponsive  Neuro     MS: Coleman Coma Scale: 1/1T/1    CN: PERRL, gaze is conjugate, , face symmetric, (-)corneals bilaterally, (-)cough    Mot: bulk normal, tone normal, no movement to noxious stimuli throughout  Chest: nonlabored respirations, no adventitious lung sounds bilaterally, heart regular rate/rhythm, present S1/S2, no murmurs or rubs  Abdomen: nondistended, soft and nontender without peritoneal signs, normoactive bowel sounds  Extremities: no clubbing, well-perfused, no edema    Vital Signs Last 24 Hrs  T(C): 35.2 (26 Aug 2023 10:19), Max: 39.2 (25 Aug 2023 14:08)  T(F): 95.4 (26 Aug 2023 10:19), Max: 102.6 (25 Aug 2023 14:08)  HR: 83 (26 Aug 2023 12:00) (65 - 117)  BP: 92/56 (26 Aug 2023 08:00) (92/56 - 105/64)  BP(mean): 69 (26 Aug 2023 08:00) (69 - 79)  RR: 18 (26 Aug 2023 12:00) (16 - 22)  SpO2: 100% (26 Aug 2023 12:00) (100% - 100%)    Parameters below as of 26 Aug 2023 12:00  Patient On (Oxygen Delivery Method): ventilator    O2 Concentration (%): 40  I&O's Detail    25 Aug 2023 07:01  -  26 Aug 2023 07:00  --------------------------------------------------------  IN:    dextrose 5%: 450 mL    dextrose 5%: 1000 mL    dextrose 5%: 1000 mL    dextrose 5%: 1999 mL    dextrose 5%: 2300 mL    Insulin: 46 mL    Insulin: 14 mL    IV PiggyBack: 250 mL    IV PiggyBack: 100 mL    Jevity 1.2: 480 mL    Propofol: 72.9 mL    Propofol: 275.4 mL    sodium chloride 0.225%: 300 mL  Total IN: 8287.3 mL    OUT:    External Ventricular Device (mL): 246 mL    Indwelling Catheter - Urethral (mL): 9335 mL    Insulin: 0 mL  Total OUT: 9581 mL    Total NET: -1293.7 mL      26 Aug 2023 07:01  -  26 Aug 2023 12:51  --------------------------------------------------------  IN:    dextrose 5%: 600 mL    dextrose 5%: 200 mL    Insulin: 96 mL    IV PiggyBack: 300 mL    Jevity 1.2: 100 mL    Propofol: 135 mL  Total IN: 1431 mL    OUT:    External Ventricular Device (mL): 30 mL    Indwelling Catheter - Urethral (mL): 850 mL  Total OUT: 880 mL    Total NET: 551 mL        ABG - ( 25 Aug 2023 06:34 )  pH, Arterial: 7.39  pH, Blood: x     /  pCO2: 40    /  pO2: 159   / HCO3: 24    / Base Excess: -0.7  /  SaO2: 100.0                                   10.9   10.56 )-----------( 97       ( 26 Aug 2023 03:59 )             34.0     08-26    x   |  x   |  x   ----------------------------<  202<H>  x    |  24  |  0.62    Ca    8.5      26 Aug 2023 11:16  Phos  3.9     08-26  Mg     2.0     08-26    TPro  6.0  /  Alb  3.0<L>  /  TBili  0.2  /  DBili  x   /  AST  27  /  ALT  49<H>  /  AlkPhos  68  08-26      MEDICATIONS  (STANDING):  atorvastatin 80 milliGRAM(s) Oral at bedtime  cefTRIAXone   IVPB 2000 milliGRAM(s) IV Intermittent every 12 hours  chlorhexidine 0.12% Liquid 15 milliLiter(s) Oral Mucosa every 12 hours  chlorhexidine 2% Cloths 1 Application(s) Topical <User Schedule>  dextrose 5%. 1000 milliLiter(s) (200 mL/Hr) IV Continuous <Continuous>  dextrose 50% Injectable 50 milliLiter(s) IV Push every 15 minutes  heparin   Injectable 5000 Unit(s) SubCutaneous every 8 hours  insulin glargine Injectable (LANTUS) 16 Unit(s) SubCutaneous at bedtime  insulin regular Infusion 14 Unit(s)/Hr (14 mL/Hr) IV Continuous <Continuous>  levETIRAcetam  IVPB 500 milliGRAM(s) IV Intermittent every 12 hours  niMODipine 60 milliGRAM(s) Oral every 4 hours  pantoprazole  Injectable 40 milliGRAM(s) IV Push two times a day  polyethylene glycol 3350 17 Gram(s) Oral two times a day  propofol Infusion 10 MICROgram(s)/kG/Min (5.4 mL/Hr) IV Continuous <Continuous>  senna 2 Tablet(s) Oral two times a day    MEDICATIONS  (PRN):  acetaminophen   Oral Liquid .. 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 MCA territory stroke  #Intracranial hypertension due to SAH, stroke  #Acute respiratory failure due to catastrophic brain injury  #R. 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 influenzae    NEURO  - Admit NSICU, Q1h neuro checks / Q1h vital signs / Q1h pupillometry / groin checks  - IRRAS, monitor output, color  - /500, nimotop 60q4, baseline TCD  - Propofol for sedation, titrate to burst suppression  - PT/OT if/when able    PULM  - AC/VC 18/410/40%/+5, pressure support as tolerated  - Trend BNP, daily EKGs  - SpO2 goal > 92%, supplemental O2 and pulm toileting as needed    CARDIO  - BP goal: SBP < 160  - Trend cardiac troponins, repeat limited TTE  - Cardiology consultation    GI  - Diet: TFs  - Stress ulcer prophylaxis: PPI BID given suspicion for GIB  - Stool count, bowel regimen    /RENAL  - Monitor UOP/volume status, BUN/SCr  - D5W at 200cc/h  - DDAVP 0.5mcg as needed for UOP > 300cc/h x3d  - Euvolemia/eunatremia    HEME  - Maintain Hb > 7.0, PLT > 100,000 in setting of fresh bleed  - SCDs, SQL  - Holding off on IVCF placement for now as patient is not stable    ID  - CTX CNS coverage, f/u cultures  - Monitor for infectious s/s, fever curve, leukocytosis    ENDO  - SGlu < 200  - Insulin gtt  - RASSI     ***********************************************  ADULT NSICU PROGRESS NOTE  RAPHAEL CAMACHO [id number] [facility] 08EA [id number] [id number]  ***********************************************    INTERVAL:   - Both pupils fixed and dilated overnight  - VEEG read as c/w electrocerebral silence in appropriate clinical setting  - Continued correction of SNa  - Brain death/apnea testing today    ROS: negative except per mentioned above in 24h interval events.      EXAM:     Agustín Coma Scale: 1/1T/1    General: normocephalic, (+)EVD, laying in bed, unresponsive  Neuro     MS: Agustín Coma Scale: 1/1T/1    CN: Pupils 6mm and fixed, (-)corneal reflexes, (-)gag or cough reflexes to deep trach or oropharyngeal suctioning, (-)OCR bilaterally, (-)cold calorics bilaterally    Mot: bulk normal, tone normal, no movement to noxious stimuli throughout  Chest: nonlabored respirations, no adventitious lung sounds bilaterally, heart regular rate/rhythm, present S1/S2, no murmurs or rubs  Abdomen: nondistended, soft and nontender without peritoneal signs, normoactive bowel sounds  Extremities: no clubbing, well-perfused, no edema    Vital Signs Last 24 Hrs  T(C): 37 (27 Aug 2023 08:57), Max: 38.1 (26 Aug 2023 17:30)  T(F): 98.6 (27 Aug 2023 08:57), Max: 100.6 (26 Aug 2023 17:30)  HR: 82 (27 Aug 2023 11:00) (73 - 128)  BP: 101/58 (26 Aug 2023 21:00) (101/58 - 101/58)  BP(mean): 75 (26 Aug 2023 21:00) (75 - 75)  RR: 12 (27 Aug 2023 11:00) (12 - 18)  SpO2: 100% (27 Aug 2023 11:00) (98% - 100%)    Parameters below as of 27 Aug 2023 11:00  Patient On (Oxygen Delivery Method): ventilator    O2 Concentration (%): 40  I&O's Detail    26 Aug 2023 07:01  -  27 Aug 2023 07:00  --------------------------------------------------------  IN:    dextrose 5%: 600 mL    dextrose 5%: 1200 mL    dextrose 5%: 525 mL    dextrose 5%: 450 mL    dextrose 5%: 700 mL    Enteral Tube Flush: 220 mL    Glucerna: 385 mL    Insulin: 201.3 mL    IV PiggyBack: 250 mL    IV PiggyBack: 600 mL    Jevity 1.2: 455 mL    Lactated Ringers Bolus: 1000 mL    Norepinephrine: 70.9 mL    Phenylephrine: 337.5 mL    Propofol: 351 mL  Total IN: 7345.7 mL    OUT:    External Ventricular Device (mL): 58 mL    Indwelling Catheter - Urethral (mL): 3015 mL  Total OUT: 3073 mL    Total NET: 4272.7 mL      27 Aug 2023 07:01  -  27 Aug 2023 12:57  --------------------------------------------------------  IN:    dextrose 5%: 1000 mL    dextrose 5%: 300 mL    Glucerna: 105 mL    Norepinephrine: 74.4 mL  Total IN: 1479.4 mL    OUT:    External Ventricular Device (mL): 0 mL    Indwelling Catheter - Urethral (mL): 1140 mL    Phenylephrine: 0 mL  Total OUT: 1140 mL    Total NET: 339.4 mL        ABG - ( 27 Aug 2023 09:55 )  pH, Arterial: 7.39  pH, Blood: x     /  pCO2: 47    /  pO2: 169   / HCO3: 28    / Base Excess: 2.8   /  SaO2: 100.0                                   9.4    12.02 )-----------( 74       ( 27 Aug 2023 06:49 )             30.2     08-27    156<H>  |  122<H>  |  6<L>  ----------------------------<  270<H>  3.2<L>   |  28  |  0.65    Ca    7.8<L>      27 Aug 2023 10:53  Phos  4.8     08-27  Mg     1.8     08-27    TPro  5.5<L>  /  Alb  2.7<L>  /  TBili  0.2  /  DBili  x   /  AST  35  /  ALT  45  /  AlkPhos  67  08-26      MEDICATIONS  (STANDING):  atorvastatin 80 milliGRAM(s) Oral at bedtime  cefTRIAXone   IVPB 2000 milliGRAM(s) IV Intermittent every 12 hours  chlorhexidine 0.12% Liquid 15 milliLiter(s) Oral Mucosa every 12 hours  chlorhexidine 2% Cloths 1 Application(s) Topical <User Schedule>  dextrose 5%. 1000 milliLiter(s) (100 mL/Hr) IV Continuous <Continuous>  dextrose 5%. 1000 milliLiter(s) (1000 mL/Hr) IV Continuous <Continuous>  dextrose 50% Injectable 50 milliLiter(s) IV Push every 15 minutes  insulin lispro (ADMELOG) corrective regimen sliding scale   SubCutaneous every 6 hours  insulin NPH human recombinant 10 Unit(s) SubCutaneous every 6 hours  levETIRAcetam  IVPB 500 milliGRAM(s) IV Intermittent every 12 hours  norepinephrine Infusion 0.05 MICROgram(s)/kG/Min (8.44 mL/Hr) IV Continuous <Continuous>  phenylephrine    Infusion 0.1 MICROgram(s)/kG/Min (3.38 mL/Hr) IV Continuous <Continuous>  polyethylene glycol 3350 17 Gram(s) Oral two times a day  senna 2 Tablet(s) Oral two times a day  sucralfate 1 Gram(s) Oral every 6 hours    MEDICATIONS  (PRN):  acetaminophen   Oral Liquid .. 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 MCA territory stroke  #Intracranial hypertension due to SAH, stroke  #Acute respiratory failure due to catastrophic brain injury  #R. 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 influenzae    NEURO  - Admit NSICU, Q1h neuro checks / Q1h vital signs / Q1h pupillometry / groin checks  - IRRAS, monitor output, color  - /500, nimotop 60q4, baseline TCD  - Off propofol, discontinue VEEG  - Brain death/apnea testing today  - PT/OT if/when able    PULM  - AC/VC 18/440/40%/+5  - Daily EKGs  - SpO2 goal > 92%, supplemental O2 and pulm toileting as needed    CARDIO  - BP goal: SBP < 160  - Cardiology consultation    GI  - Diet: TFs  - Stress ulcer prophylaxis: PPI BID given suspicion for GIB (guaic (+))  - Stool count, bowel regimen    /RENAL  - Monitor UOP/volume status, BUN/SCr  - D5W   - DDAVP 0.5mcg as needed for UOP > 300cc/h x3d  - Euvolemia/eunatremia    HEME  - Maintain Hb > 7.0, PLT > 100,000 in setting of fresh bleed  - SCDs, SQL held due to thrombocytopenia  - Holding off on IVCF placement for now as patient is not stable    ID  - CTX CNS coverage, f/u cultures  - Monitor for infectious s/s, fever curve, leukocytosis    ENDO  - SGlu < 200  - Insulin gtt  - RASSI

## 2023-08-27 NOTE — PROGRESS NOTE ADULT - PROVIDER SPECIALTY LIST ADULT
Infectious Disease
Infectious Disease
NSICU
Neurosurgery
Cardiology
NSICU
Neurosurgery
NSICU
Neurosurgery
Neurosurgery
Infectious Disease
NSICU
NSICU

## 2023-08-27 NOTE — PROGRESS NOTE ADULT - SUBJECTIVE AND OBJECTIVE BOX
Brain Death Checklist:   Pt's comma is irreversible and cause is HHV/MF4 L. PCOMM   There were no CNS depressant drugs used in this pt  The blood pressure was at an acceptable range during test  Pt is absent of conditions or medications which may confound examination  Core temp was above 36*  Pt has absence of motor response to pain in all four limbs  Pupils are nonreactive to bright light,   Absent oculocephalic reflex  No deviation of eyes to cold caloric testing  Absent conreal reflex  No gag reflex or cough  response to tracheobronchial suctioning  Apnea test: respirations absent and paCO2 was greater than 60 ( _yes, 79__) and there was a greater than 20mmHg above baseline ( _yes, 79_)     Test was performed at _1:53 PM__ and finished at _2:05 PM_ ABG results in system @__2:30__and __X_ reflect test.         __Benitez___ was present for the procedure and confirmed Brain death time to be 8/27/23 @ 2:01 PM Eastern Time    PLEASE NOTE RESULT TIMES IN SYSTEM REFLECT TIMES INDIVIDUAL TESTS WERE ORDERED, NOT WHEN THEY WERE PERFORMED.

## 2023-08-27 NOTE — PROVIDER CONTACT NOTE (OTHER) - DATE AND TIME:
24-Aug-2023 17:00
25-Aug-2023 21:00
25-Aug-2023 15:33
26-Aug-2023 22:00
27-Aug-2023 02:30
23-Aug-2023 20:19
25-Aug-2023 15:01

## 2023-08-27 NOTE — PROGRESS NOTE ADULT - REASON FOR ADMISSION
AMS, SAH, cerebral aneurysm
HHV/MF4 L. PCOM [facility]
AMS, SAH, cerebral aneurysm

## 2023-08-27 NOTE — DISCHARGE NOTE FOR THE EXPIRED PATIENT - HOSPITAL COURSE
53F with PMH HTN was BIBEMS to [facility] after being found unresponsive by her son this morning. CTH with diffuse SAH with extensive IVH. 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 EVD was placed urgently at [facility], Transferred to [facility] for further intervention HH5 MF4 NIHSS 31 GCS 3. 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 IRRA flow EVD exchanged.   8/24: BD2 POD 1 angio coil embolization left pcomm aneurysm. O/n, UO increased, BMP/Urine studies monitored. Na 164, patient given stat dose of 0.25 mvg IV DDAVP, UO slowed. Spiked fever to 102, pancultured. @ 5 AM, ICP increased to 25-30 sustained, given 90 mg mannitol and taken for CTH. CTH demonstrates demarcated left frontoparietal hypodensity. Hematuria also noted, clearing. EVD lowered to 0.   Ceftriaxone started for empiric coverage PNA/fever. LR fluids disocntinued and D5 IVF started. ST depressions on tele, EKG and cardiac enzymes. Na goal 150-155.  LE dopplers with L popliteal and peroneal DVTs. Vascular consulted. Start SQH for DVT ppx. PICC placed Isidro. Start TF. ICPs 20s. Given fentanyl 75 mcg x1 and propofol drip started. Serum Na uptrensing 166, however urine output stable. Gastric occult send out.   8/25: BD 3 POD 2 angio coil embo left pcomm. Remains intubated, sedated with propofol. IRRAflow EVD @ 0. AM Na 165. D/c'd 1/2 NS. Started free water 300q4, D5 1L bolus and started at 150cc/hr. Na 166. Started 1/4 NS @ 100. D5 d/c'd. Mannitol 50g given for ICPs in the low 30s. Repeat CTH w/ evolving L MCA, smaller R AVELINO infarcts, mild worsening MLS. Na 167, given 1.5L bolus of D5 and started on maintenance. 1/4 NS d/c'd. Mannitol 50g given for ICPs in the low 30s, 50mcg fentanyl IVPx1 given. repeat  ->294, ->249, lantus 12u, ICPs still high 20s, additional 50g mannitol given 10PM, 500cc bolus d5w for euvolemia,   8/26: BD4, POD3 D5 bolus 1Lx2,D5 bolus [id number], d5w @250cc/hr, FW increased 400q4, , insulin gtt started for  -> 416->407->481, midodrine 5q8 started, 50mg mannitol given 2am for ICPs >20, 0.25mcg DDAVP given for persistenly elevated uop. D5 increased 200cc/hr. Advanced TF. Na 160, decreased D5 to 125 cc/hr. Got 1L of LR for SBP in 90s and tachyardia.   8/27: BD 5, POD4 angio for aneurysm coiling. Increased D5 to 150cc/hr for Na 160. Started 25q6 NPH. Pupils nonreactive. Na goal changed to 140-150. Changed protonix to carafate for thrombocytopenia. DC'd propofol and fent pushes for brain death exam. DC'd vEEG. Decreased D5 to 100cc/hr after FS of 217. Bridged manju to levo gtt. Changed NPH to 10 from 25. 1L D5 bolus given. TF discontinued. Patient declared brain dead by neurointensivist Dr. Marquis at 2:01pm and family at bedside including son Tomi notified.

## 2023-08-27 NOTE — PROGRESS NOTE ADULT - SUBJECTIVE AND OBJECTIVE BOX
S/Overnight events:  BD 5, POD4 angio for aneurysm coiling. Increased D5 to 150cc/hr for Na 160. Started 25q6 NPH. Pupils nonreactive. Na goal changed to 140-150. Changed protonix to carafate for thrombocytopenia. DC'd propofol and fent pushes for brain death exam. DC'd vEEG. Decreased D5 to 100cc/hr after FS of 217.     Hospital Course:   8/23: BD1 POD 0 s/p angio coil embolization of giant left pcomm aneurysm. IRRA flow EVD exchanged.   8/24: BD2 POD 1 angio coil embolization left pcomm aneurysm. O/n, UO increased, BMP/Urine studies monitored. Na 164, patient given stat dose of 0.25 mvg IV DDAVP, UO slowed. Spiked fever to 102, pancultured. @ 5 AM, ICP increased to 25-30 sustained, given 90 mg mannitol and taken for CTH. CTH demonstrates demarcated left frontoparietal hypodensity. Hematuria also noted, clearing. EVD lowered to 0.   Ceftriaxone started for empiric coverage PNA/fever. LR fluids disocntinued and D5 IVF started. ST depressions on tele, EKG and cardiac enzymes. Na goal 150-155.  LE dopplers with L popliteal and peroneal DVTs. Vascular consulted. Start SQH for DVT ppx. PICC placed Isidro. Start TF. ICPs 20s. Given fentanyl 75 mcg x1 and propofol drip started. Serum Na uptrensing 166, however urine output stable. Gastric occult send out.   8/25: BD 3 POD 2 angio coil embo left pcomm. Remains intubated, sedated with propofol. IRRAflow EVD @ 0. AM Na 165. D/c'd 1/2 NS. Started free water 300q4, D5 1L bolus and started at 150cc/hr. Na 166. Started 1/4 NS @ 100. D5 d/c'd. Mannitol 50g given for ICPs in the low 30s. Repeat CTH w/ evolving L MCA, smaller R AVELINO infarcts, mild worsening MLS. Na 167, given 1.5L bolus of D5 and started on maintenance. 1/4 NS d/c'd. Mannitol 50g given for ICPs in the low 30s, 50mcg fentanyl IVPx1 given. repeat  ->294, ->249, lantus 12u, ICPs still high 20s, additional 50g mannitol given 10PM, 500cc bolus d5w for euvolemia,   8/26: BD4, POD3 D5 bolus 1Lx2,D5 bolus [id number], d5w @250cc/hr, FW increased 400q4, , insulin gtt started for  -> 416->407->481, midodrine 5q8 started, 50mg mannitol given 2am for ICPs >20, 0.25mcg DDAVP given for persistenly elevated uop. D5 increased 200cc/hr. Advanced TF. Na 160, decreased D5 to 125 cc/hr. Got 1L of LR for SBP in 90s and tachyardia.     Vital Signs Last 24 Hrs  T(C): 37.6 (27 Aug 2023 00:36), Max: 38.1 (26 Aug 2023 17:30)  T(F): 99.6 (27 Aug 2023 00:36), Max: 100.6 (26 Aug 2023 17:30)  HR: 86 (27 Aug 2023 01:00) (65 - 128)  BP: 101/58 (26 Aug 2023 21:00) (92/56 - 105/64)  BP(mean): 75 (26 Aug 2023 21:00) (69 - 79)  RR: 12 (27 Aug 2023 01:00) (12 - 20)  SpO2: 100% (27 Aug 2023 01:00) (99% - 100%)    Parameters below as of 27 Aug 2023 01:00  Patient On (Oxygen Delivery Method): ventilator    O2 Concentration (%): 40    I&O's Detail    25 Aug 2023 07:01  -  26 Aug 2023 07:00  --------------------------------------------------------  IN:    dextrose 5%: 450 mL    dextrose 5%: 1000 mL    dextrose 5%: 1000 mL    dextrose 5%: 1999 mL    dextrose 5%: 2300 mL    Insulin: 46 mL    Insulin: 14 mL    IV PiggyBack: 250 mL    IV PiggyBack: 100 mL    Jevity 1.2: 480 mL    Propofol: 72.9 mL    Propofol: 275.4 mL    sodium chloride 0.225%: 300 mL  Total IN: 8287.3 mL    OUT:    External Ventricular Device (mL): 246 mL    Indwelling Catheter - Urethral (mL): 9335 mL    Insulin: 0 mL  Total OUT: 9581 mL    Total NET: -1293.7 mL    26 Aug 2023 07:01  -  27 Aug 2023 02:06  --------------------------------------------------------  IN:    dextrose 5%: 600 mL    dextrose 5%: 1200 mL    dextrose 5%: 525 mL    dextrose 5%: 450 mL    dextrose 5%: 200 mL    Insulin: 201.3 mL    IV PiggyBack: 250 mL    IV PiggyBack: 450 mL    Jevity 1.2: 665 mL    Lactated Ringers Bolus: 1000 mL    Phenylephrine: 253.1 mL    Propofol: 351 mL  Total IN: 6145.4 mL    OUT:    External Ventricular Device (mL): 58 mL    Indwelling Catheter - Urethral (mL): 2065 mL  Total OUT: 2123 mL    Total NET: 4022.4 mL    I&O's Summary    25 Aug 2023 07:01  -  26 Aug 2023 07:00  --------------------------------------------------------  IN: 8287.3 mL / OUT: 9581 mL / NET: -1293.7 mL    26 Aug 2023 07:01  -  27 Aug 2023 02:06  --------------------------------------------------------  IN: 6145.4 mL / OUT: 2123 mL / NET: 4022.4 mL    PHYSICAL EXAM:  General: Pt is sitting up in bed, in NAD, +intubated on full vent support  HEENT: Pupils nonreactive BL, doesn't OE, no cough/gag/corneal reflexes   Cardiovascular: RRR, normal S1 and S2   Respiratory: non-labored breathing, symmetric chest rise, +ET tube  GI: abd soft, NTND   Neuro: A&O x 0, not following commands, 0/5 x 4 extremities   Drains: IRRA flow EVD in place    TUBES/LINES:  [] CVC  [X] A-line  [] Lumbar Drain  [] Ventriculostomy  [] Other    DIET:  [] NPO  [] Mechanical  [X] Tube feeds    LABS:    Urinalysis Basic - ( 26 Aug 2023 23:04 )    Color: x / Appearance: x / SG: x / pH: x  Gluc: 146 mg/dL / Ketone: x  / Bili: x / Urobili: x   Blood: x / Protein: x / Nitrite: x   Leuk Esterase: x / RBC: x / WBC x   Sq Epi: x / Non Sq Epi: x / Bacteria: x    CAPILLARY BLOOD GLUCOSE    POCT Blood Glucose.: 217 mg/dL (27 Aug 2023 00:49)  POCT Blood Glucose.: 167 mg/dL (27 Aug 2023 00:07)  POCT Blood Glucose.: 133 mg/dL (26 Aug 2023 22:58)  POCT Blood Glucose.: 117 mg/dL (26 Aug 2023 22:13)  POCT Blood Glucose.: 109 mg/dL (26 Aug 2023 21:06)  POCT Blood Glucose.: 116 mg/dL (26 Aug 2023 19:53)  POCT Blood Glucose.: 148 mg/dL (26 Aug 2023 18:59)  POCT Blood Glucose.: 150 mg/dL (26 Aug 2023 17:56)  POCT Blood Glucose.: 90 mg/dL (26 Aug 2023 16:58)  POCT Blood Glucose.: 108 mg/dL (26 Aug 2023 16:11)  POCT Blood Glucose.: 134 mg/dL (26 Aug 2023 14:57)  POCT Blood Glucose.: 170 mg/dL (26 Aug 2023 14:04)  POCT Blood Glucose.: 154 mg/dL (26 Aug 2023 12:55)  POCT Blood Glucose.: 168 mg/dL (26 Aug 2023 11:53)  POCT Blood Glucose.: 209 mg/dL (26 Aug 2023 11:02)  POCT Blood Glucose.: 226 mg/dL (26 Aug 2023 09:58)  POCT Blood Glucose.: 254 mg/dL (26 Aug 2023 08:58)  POCT Blood Glucose.: 318 mg/dL (26 Aug 2023 08:05)  POCT Blood Glucose.: 415 mg/dL (26 Aug 2023 07:18)  POCT Blood Glucose.: 458 mg/dL (26 Aug 2023 06:09)  POCT Blood Glucose.: 501 mg/dL (26 Aug 2023 05:04)  POCT Blood Glucose.: 481 mg/dL (26 Aug 2023 04:00)  POCT Blood Glucose.: 407 mg/dL (26 Aug 2023 03:05)    Allergies    No Known Allergies    Intolerances    MEDICATIONS:  Antibiotics:  cefTRIAXone   IVPB 2000 milliGRAM(s) IV Intermittent every 12 hours    Neuro:  acetaminophen   Oral Liquid .. 650 milliGRAM(s) Oral every 6 hours PRN  levETIRAcetam  IVPB 500 milliGRAM(s) IV Intermittent every 12 hours    Anticoagulation:  heparin   Injectable 5000 Unit(s) SubCutaneous every 8 hours    OTHER:  atorvastatin 80 milliGRAM(s) Oral at bedtime  chlorhexidine 0.12% Liquid 15 milliLiter(s) Oral Mucosa every 12 hours  chlorhexidine 2% Cloths 1 Application(s) Topical <User Schedule>  dextrose 50% Injectable 50 milliLiter(s) IV Push every 15 minutes  insulin lispro (ADMELOG) corrective regimen sliding scale   SubCutaneous every 6 hours  insulin NPH human recombinant 25 Unit(s) SubCutaneous every 6 hours  insulin regular Infusion 14 Unit(s)/Hr IV Continuous <Continuous>  phenylephrine    Infusion 0.1 MICROgram(s)/kG/Min IV Continuous <Continuous>  polyethylene glycol 3350 17 Gram(s) Oral two times a day  senna 2 Tablet(s) Oral two times a day  sucralfate 1 Gram(s) Oral every 6 hours    IVF:  dextrose 5%. 1000 milliLiter(s) IV Continuous <Continuous>    CULTURES:  Culture Results:   Few-moderate Staphylococcus aureus  Accompanied by normal respiratory shmuel (08-24 @ 17:35)  Culture Results:   Culture in progress (08-24 @ 14:28)    RADIOLOGY & ADDITIONAL TESTS:  < from: CT Head No Cont (08.25.23 @ 11:45) >  IMPRESSION:    Evolving left MCA and smaller right AVELINO territory acute-subacute   infarcts. The former is quite large and there is mild worsening of   midline shift. Close followup is advised to assess need for decompression.    Mild improvement in ventricular size with EVD in place. Large IVH and SAH   is grossly unchanged given streak artifact from large coil pack.      < end of copied text >    ASSESSMENT:  53F with PMH HTN was BIBEMS to [facility] after being found unresponsive by her son this morning. CTH with diffuse SAH with extensive IVH. 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 EVD was placed urgently at [facility], Transferred to [facility] for further intervention HH5 MF4 NIHSS 31 GCS 3. 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     INTRACEREBRAL HEMORRHAGE    Handoff    Hypertension    Cerebral aneurysm    Cerebral aneurysm    Subarachnoid hemorrhage    Ischemic stroke    Functional quadriplegia    Advanced care planning/counseling discussion    Encounter for palliative care    Embolization, aneurysm, intracranial    SysAdmin_[email or web address]    PLAN:  Neuro:  - Neuro checks/vitals q1h   - Continue keppra for seizure prophylaxis  - IRRAflow EVD open @ 0 cmH2O, monitor ICPs and output  - CTH 8/24: new large left frontoparietal and small right paramedian regions of hypodensity which may reflect edema and/or ischemia. Slight enlargement of the bilateral frontal and right temporal horns, 8/25: Evolving left MCA and smaller right AVELINO territory acute-subacute infarcts, mild worsening MLS Large IVH and SAH grossly unchanged   - Nimodipine 60 mg q4h   - vEEG DC'd  - Propofol DC'd  - Stroke core measures   - mannitol prn target gap <20    Cardiac:  - SBP >100, manju gtt  - Lipitor started  - Cardiogloy following, NSTEMI: ST depressions on EKG, Qtc 455 8/26  - Continue trending troponin, downtrending   - Suboptimal echo 8/24, repeat TTE 8/25 EF 70-75%    Pulm:   - + Intubation, VC AC 40/410/12/5    GI:  - + OGT, NPO @ 6am  - Bowel regimen, pending BM  - Carafate 1g q6hrs - coffee ground output from OGT, resolved    Renal:   - Mccormick placed at OSH, exchanged on 8/23, hematuria resolved  - D5 @100cc/hr, FW 400cc q4h  - Na goal 140-150  - Strict I's and O's, euvolemia    Endo:  - Q1hr FS,   - ISS, insulin gtt, 25u NPH q 6hrs.  - a1c 6.2  - DI: s/p DDAVP 8/23, 8/26    ID:  - ID following, reccomend CTX 2g BID (8/24 - )  - CSF HHV6, hemophilus; repeat CSF cx HHV6  - Sputum cx 8/24 +GPC likely MSSA    Heme:  - SCD right leg only in place, SQH for DVT prophylaxis   - LE dopplers 8/24 with left popliteal and peroneal DVT vasc surg c/s - IVCF when stable  - Monitor thrombocytopenia     Dispo:   - ICU status, full code, dispo pending  - Palliative following for GOC   - [facility] following     D/w Dr. Wooten, Dr. Agrawal and Dr. Silver

## 2023-08-27 NOTE — PROVIDER CONTACT NOTE (OTHER) - SITUATION
s/p diffused SAH with extensive ICH.  Iraas Flow EVD ouput 20cc serioussanguionous drainage.
ICP >20 prolonged and sustained.
PT  after 4 units of lispro insulin
Pt ICP >20. Team aware. Mannitol given. Pt ICP >20 again at 3am. Mannitol given again.
Pt currently on insulin drip, sugars have been less than 150 for 5 hours
s/p diffuse SAH  and extensive IVH with irra floW evd
s/p diffuse SAH with extensive IVH with EVD

## 2023-08-27 NOTE — EEG REPORT - NS EEG TEXT BOX
[facility] Department of Neurology  Inpatient Continuous video-Electroencephalogram    Patient Name:	RAPHAEL CAMACHO    :	1970  MRN:	[id number]    Study Start Date/Time:  2023, 12:00:03 PM  Study End Date/Time:    Referred by: Dr Wooten    Brief clinical history:   53 y old woman with known hypertension. Now with acute SAH with extensive IVH, in the setting of left posterior communicating artery aneurysm. Status post  angio coil embolization.    Diagnosis Code:   R56.9 convulsions/seizure    Current anticonvulsants:  Levetiracetam      Acquisition details:  Electroencephalography was acquired using a minimum of 21 channels on an [employer] system v 9.3.1 with electrode placement according to the standard International 10-20 system following ACNS (American Clinical Neurophysiology Society) guidelines for Long-Term Video EEG monitoring.  Anterior temporal T1 and T2 electrodes were utilized whenever possible.   The [employer] automated spike & seizure detections were all reviewed in detail, in addition to extensive portions of raw EEG.    Day 1  From 2023 at 12:00:03 PM to 2023 at 07:00:00 AM  The awake state was not achieved by the patient.  The background was characterized by the presence of a poorly organized discontinuous mixture of mainly delta and theta frequencies.  Overt burst suppression patterns were noted after 04:00:00 on 2023. Suppression periods were 2-3 seconds long in duration. Burst periods were characterized by the presence of left greater than right hemispheric synchronous and asynchronous periodic lateralizing epileptiform discharges (PLEDs).  No clinical events of concern occurred.  No electrographic or electroclinical seizures occurred.    Day 1 Impression:   Abnormal tracing due to the presence of:  1)	Burst suppression patterns  2)	Abundant periodic lateralizing epileptiform discharges, left greater than right hemispheres, synchronous and asynchronous (PLEDs)    Day 1 clinical correlation:  Abnormal tracing.  The above mentioned findings are consistent with the presence of severe encephalopathic state with left greater than right potential epileptogenicity.  No clinical events occurred.  No electrographic or electroclinical seizures occurred.    The undersigned attending physicians have reviewed portions of this record on the dates documented below:  On 2023 the study was reviewed from 2023 at 12:00:13 PM to 2023 at 07:00:00 AM by Anny Redmond MD    Day 2  From 2023 at 07:00:00 AM to 2023 at 07:00:00 AM  The background was characterized by the presence of a poorly organized discontinuous mixture of mainly delta and theta frequencies.  Overt burst suppression patterns were noted after 04:00:00 on 2023. Suppression periods were 2-3 seconds long in duration. Burst periods were characterized by the presence of left greater than right hemispheric synchronous and asynchronous periodic lateralizing epileptiform discharges (PLEDs).  On 2023, after 12:00:00 PM a fixed asymmetry was noted, with increasing attenuation of all voltages over the left hemisphere.  On 2023, after 03:00:00 PM progressive diffuse voltage attenuation was noted.  On 2023, after 05:30:00 PM, electrocerebral silence patterns were present.  No clinical events of concern occurred.  No electrographic or electroclinical seizures occurred.    Day 2 Impression:   Abnormal tracing due to the presence of:  1)	Electrocerebral silence     Day 2 clinical correlation:  Abnormal tracing.  In the appropriate clinical setting (i.e. when other causes like drug overdose are excluded), the above mentioned isoelectric pattern is associated with brain death.     The undersigned attending physicians have reviewed portions of this record on the dates documented below:  On 2023 the study was reviewed from 2023 at 07:00:00 AM to 2023 at 07:00:00 AM by Anny Redmond MD

## 2023-08-27 NOTE — PROGRESS NOTE ADULT - SUBJECTIVE AND OBJECTIVE BOX
INTERVAL HPI/OVERNIGHT EVENTS:    Coverage for Dr. Barboza (Team 1)    Patient was seen and examined at bedside.  Afebrile overnight    ROS:    unable to obtain           ANTIBIOTICS/RELEVANT:    MEDICATIONS  (STANDING):  atorvastatin 80 milliGRAM(s) Oral at bedtime  cefTRIAXone   IVPB 2000 milliGRAM(s) IV Intermittent every 12 hours  chlorhexidine 0.12% Liquid 15 milliLiter(s) Oral Mucosa every 12 hours  chlorhexidine 2% Cloths 1 Application(s) Topical <User Schedule>  dextrose 5%. 1000 milliLiter(s) (100 mL/Hr) IV Continuous <Continuous>  dextrose 5%. 1000 milliLiter(s) (1000 mL/Hr) IV Continuous <Continuous>  dextrose 50% Injectable 50 milliLiter(s) IV Push every 15 minutes  insulin lispro (ADMELOG) corrective regimen sliding scale   SubCutaneous every 6 hours  insulin NPH human recombinant 10 Unit(s) SubCutaneous every 6 hours  levETIRAcetam  IVPB 500 milliGRAM(s) IV Intermittent every 12 hours  norepinephrine Infusion 0.05 MICROgram(s)/kG/Min (8.44 mL/Hr) IV Continuous <Continuous>  phenylephrine    Infusion 0.1 MICROgram(s)/kG/Min (3.38 mL/Hr) IV Continuous <Continuous>  polyethylene glycol 3350 17 Gram(s) Oral two times a day  senna 2 Tablet(s) Oral two times a day  sucralfate 1 Gram(s) Oral every 6 hours    MEDICATIONS  (PRN):  acetaminophen   Oral Liquid .. 650 milliGRAM(s) Oral every 6 hours PRN Temp greater or equal to 38C (100.4F), Mild Pain (1 - 3)  sodium chloride 0.9% lock flush 10 milliLiter(s) IV Push every 1 hour PRN Pre/post blood products, medications, blood draw, and to maintain line patency        Vital Signs Last 24 Hrs  T(C): 37 (27 Aug 2023 08:57), Max: 38.1 (26 Aug 2023 17:30)  T(F): 98.6 (27 Aug 2023 08:57), Max: 100.6 (26 Aug 2023 17:30)  HR: 82 (27 Aug 2023 13:00) (73 - 128)  BP: 101/58 (26 Aug 2023 21:00) (101/58 - 101/58)  BP(mean): 75 (26 Aug 2023 21:00) (75 - 75)  RR: 12 (27 Aug 2023 13:00) (12 - 18)  SpO2: 100% (27 Aug 2023 13:00) (98% - 100%)    Parameters below as of 27 Aug 2023 13:00  Patient On (Oxygen Delivery Method): ventilator    O2 Concentration (%): 40    PHYSICAL EXAM:  Constitutional: non-toxic, no distress  Eyes:GABRIELA, EOMI  Ear/Nose/Throat: no oral lesion, no sinus tenderness on percussion	  Neck:  supple  Respiratory: CTA luciano  Cardiovascular: S1S2 RRR, no murmurs  Gastrointestinal:soft, (+) BS, no HSM  Extremities:no e/e/c  Vascular: DP Pulse:	right normal; left normal      LABS:                        9.4    12.02 )-----------( 74       ( 27 Aug 2023 06:49 )             30.2     08-27    156<H>  |  122<H>  |  6<L>  ----------------------------<  270<H>  3.2<L>   |  28  |  0.65    Ca    7.8<L>      27 Aug 2023 10:53  Phos  4.8     08-27  Mg     1.8     08-27    TPro  5.5<L>  /  Alb  2.7<L>  /  TBili  0.2  /  DBili  x   /  AST  35  /  ALT  45  /  AlkPhos  67  08-26      Urinalysis Basic - ( 27 Aug 2023 10:53 )    Color: x / Appearance: x / SG: x / pH: x  Gluc: 270 mg/dL / Ketone: x  / Bili: x / Urobili: x   Blood: x / Protein: x / Nitrite: x   Leuk Esterase: x / RBC: x / WBC x   Sq Epi: x / Non Sq Epi: x / Bacteria: x        MICROBIOLOGY:    Culture - Sputum . (08.24.23 @ 17:35)    -  Rifampin: S <=1 Should not be used as monotherapy   -  Trimethoprim/Sulfamethoxazole: S <=0.5/9.5   -  Vancomycin: S 2   Gram Stain:   No epithelial cells seen  Few-moderate White blood cells  Rare Gram positive cocci in pairs   -  Clindamycin: S 0.5   -  Erythromycin: S <=0.25   -  Linezolid: S 4   -  Oxacillin: S <=0.25 Oxacillin predicts susceptibility for dicloxacillin, methicillin, and nafcillin   Specimen Source: ET Tube ET Tube   Culture Results:   Few-moderate Staphylococcus aureus  Accompanied by normal respiratory shmuel   Organism Identification: Staphylococcus aureus   Organism: Staphylococcus aureus   Method Type: YANICK    Culture - CSF with Gram Stain . (08.24.23 @ 14:28)    Gram Stain:   No organisms seen  Few White blood cells   Specimen Source: .CSF CSF   Culture Results:   No growth to date        RADIOLOGY & ADDITIONAL STUDIES:

## 2023-08-27 NOTE — PROVIDER CONTACT NOTE (OTHER) - ACTION/TREATMENT ORDERED:
neuro PA Lupe Camargo aware. States to monitor for the next hour.  Primary RN Dinora to continue with care.
total of 1.5 liters Bolus of Dextrose 5%, mannitol 50gram given  followed by dextrose 5% started at 100ml  and titrated based on urine output as per dr. hanks. Primary RN Iker to continue with care.
Sliding scale ordered Q1 until BMP results, ordered to recheck at 0300 and treat that sugar and then reassess with BMP results
Cooling blanket/Dr. Marquis at bedside and aware of increasing ICPs 22-26.1, labs to be drawn, possible mannitol again pending lab results
NPH ordered Q6. Give NPH and continue insuline drip for 1 hour, the stop insulin drip
Propofol gtt started, 75 mcg fentanyl given. ICP stayed the same, Mannitol 50g given

## 2023-08-29 LAB
CULTURE RESULTS: SIGNIFICANT CHANGE UP
ORGANISM # SPEC MICROSCOPIC CNT: SIGNIFICANT CHANGE UP
ORGANISM # SPEC MICROSCOPIC CNT: SIGNIFICANT CHANGE UP
SPECIMEN SOURCE: SIGNIFICANT CHANGE UP

## 2023-09-05 PROBLEM — Z00.00 ENCOUNTER FOR PREVENTIVE HEALTH EXAMINATION: Status: ACTIVE | Noted: 2023-09-05

## 2023-09-07 LAB
CULTURE RESULTS: NO GROWTH — SIGNIFICANT CHANGE UP
CULTURE RESULTS: NO GROWTH — SIGNIFICANT CHANGE UP
SPECIMEN SOURCE: SIGNIFICANT CHANGE UP
SPECIMEN SOURCE: SIGNIFICANT CHANGE UP

## 2023-10-08 PROCEDURE — 80048 BASIC METABOLIC PNL TOTAL CA: CPT

## 2023-10-08 PROCEDURE — 85610 PROTHROMBIN TIME: CPT

## 2023-10-08 PROCEDURE — 81001 URINALYSIS AUTO W/SCOPE: CPT

## 2023-10-08 PROCEDURE — 86900 BLOOD TYPING SEROLOGIC ABO: CPT

## 2023-10-08 PROCEDURE — 80053 COMPREHEN METABOLIC PANEL: CPT

## 2023-10-08 PROCEDURE — 82803 BLOOD GASES ANY COMBINATION: CPT

## 2023-10-08 PROCEDURE — 83935 ASSAY OF URINE OSMOLALITY: CPT

## 2023-10-08 PROCEDURE — 87040 BLOOD CULTURE FOR BACTERIA: CPT

## 2023-10-08 PROCEDURE — 87086 URINE CULTURE/COLONY COUNT: CPT

## 2023-10-08 PROCEDURE — 84100 ASSAY OF PHOSPHORUS: CPT

## 2023-10-08 PROCEDURE — 84484 ASSAY OF TROPONIN QUANT: CPT

## 2023-10-08 PROCEDURE — 94003 VENT MGMT INPAT SUBQ DAY: CPT

## 2023-10-08 PROCEDURE — 80061 LIPID PANEL: CPT

## 2023-10-08 PROCEDURE — 87635 SARS-COV-2 COVID-19 AMP PRB: CPT

## 2023-10-08 PROCEDURE — 83930 ASSAY OF BLOOD OSMOLALITY: CPT

## 2023-10-08 PROCEDURE — 85027 COMPLETE CBC AUTOMATED: CPT

## 2023-10-08 PROCEDURE — 93970 EXTREMITY STUDY: CPT

## 2023-10-08 PROCEDURE — C1760: CPT

## 2023-10-08 PROCEDURE — 82550 ASSAY OF CK (CPK): CPT

## 2023-10-08 PROCEDURE — C1769: CPT

## 2023-10-08 PROCEDURE — 81003 URINALYSIS AUTO W/O SCOPE: CPT

## 2023-10-08 PROCEDURE — 95700 EEG CONT REC W/VID EEG TECH: CPT

## 2023-10-08 PROCEDURE — 83986 ASSAY PH BODY FLUID NOS: CPT

## 2023-10-08 PROCEDURE — 84478 ASSAY OF TRIGLYCERIDES: CPT

## 2023-10-08 PROCEDURE — 84443 ASSAY THYROID STIM HORMONE: CPT

## 2023-10-08 PROCEDURE — 86850 RBC ANTIBODY SCREEN: CPT

## 2023-10-08 PROCEDURE — 84300 ASSAY OF URINE SODIUM: CPT

## 2023-10-08 PROCEDURE — 87205 SMEAR GRAM STAIN: CPT

## 2023-10-08 PROCEDURE — 83735 ASSAY OF MAGNESIUM: CPT

## 2023-10-08 PROCEDURE — 93306 TTE W/DOPPLER COMPLETE: CPT

## 2023-10-08 PROCEDURE — 36415 COLL VENOUS BLD VENIPUNCTURE: CPT

## 2023-10-08 PROCEDURE — 89051 BODY FLUID CELL COUNT: CPT

## 2023-10-08 PROCEDURE — 84702 CHORIONIC GONADOTROPIN TEST: CPT

## 2023-10-08 PROCEDURE — 83605 ASSAY OF LACTIC ACID: CPT

## 2023-10-08 PROCEDURE — 87070 CULTURE OTHR SPECIMN AEROBIC: CPT

## 2023-10-08 PROCEDURE — 94002 VENT MGMT INPAT INIT DAY: CPT

## 2023-10-08 PROCEDURE — 87483 CNS DNA AMP PROBE TYPE 12-25: CPT

## 2023-10-08 PROCEDURE — 86901 BLOOD TYPING SEROLOGIC RH(D): CPT

## 2023-10-08 PROCEDURE — 71045 X-RAY EXAM CHEST 1 VIEW: CPT

## 2023-10-08 PROCEDURE — 83880 ASSAY OF NATRIURETIC PEPTIDE: CPT

## 2023-10-08 PROCEDURE — 82962 GLUCOSE BLOOD TEST: CPT

## 2023-10-08 PROCEDURE — 82271 OCCULT BLOOD OTHER SOURCES: CPT

## 2023-10-08 PROCEDURE — C1889: CPT

## 2023-10-08 PROCEDURE — 85730 THROMBOPLASTIN TIME PARTIAL: CPT

## 2023-10-08 PROCEDURE — 85347 COAGULATION TIME ACTIVATED: CPT

## 2023-10-08 PROCEDURE — 84145 PROCALCITONIN (PCT): CPT

## 2023-10-08 PROCEDURE — C1887: CPT

## 2023-10-08 PROCEDURE — 87186 SC STD MICRODIL/AGAR DIL: CPT

## 2023-10-08 PROCEDURE — 74018 RADEX ABDOMEN 1 VIEW: CPT

## 2023-10-08 PROCEDURE — 82553 CREATINE MB FRACTION: CPT

## 2023-10-08 PROCEDURE — C8924: CPT

## 2023-10-08 PROCEDURE — 85025 COMPLETE CBC W/AUTO DIFF WBC: CPT

## 2023-10-08 PROCEDURE — 84157 ASSAY OF PROTEIN OTHER: CPT

## 2023-10-08 PROCEDURE — 82945 GLUCOSE OTHER FLUID: CPT

## 2023-10-08 PROCEDURE — 83036 HEMOGLOBIN GLYCOSYLATED A1C: CPT

## 2023-10-08 PROCEDURE — 70450 CT HEAD/BRAIN W/O DYE: CPT

## 2023-10-08 PROCEDURE — C1894: CPT

## 2023-10-08 PROCEDURE — 95708 EEG WO VID EA 12-26HR UNMNTR: CPT

## 2024-01-01 NOTE — PROGRESS NOTE ADULT - TIME BILLING
-Fewer than 5 wet diapers per day
treatment of VAP and possible meningitis
treatment of suspected meningitis
As above